# Patient Record
Sex: MALE | Race: WHITE | NOT HISPANIC OR LATINO | URBAN - METROPOLITAN AREA
[De-identification: names, ages, dates, MRNs, and addresses within clinical notes are randomized per-mention and may not be internally consistent; named-entity substitution may affect disease eponyms.]

---

## 2019-09-28 ENCOUNTER — EMERGENCY (EMERGENCY)
Facility: HOSPITAL | Age: 79
LOS: 0 days | Discharge: HOME | End: 2019-09-28
Attending: EMERGENCY MEDICINE
Payer: MEDICARE

## 2019-09-28 VITALS
TEMPERATURE: 98 F | RESPIRATION RATE: 18 BRPM | HEART RATE: 60 BPM | SYSTOLIC BLOOD PRESSURE: 154 MMHG | OXYGEN SATURATION: 98 % | DIASTOLIC BLOOD PRESSURE: 85 MMHG

## 2019-09-28 VITALS — HEIGHT: 67 IN | WEIGHT: 164.91 LBS

## 2019-09-28 DIAGNOSIS — Z91.013 ALLERGY TO SEAFOOD: ICD-10-CM

## 2019-09-28 DIAGNOSIS — N40.0 BENIGN PROSTATIC HYPERPLASIA WITHOUT LOWER URINARY TRACT SYMPTOMS: ICD-10-CM
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
## 2019-09-28 DIAGNOSIS — E11.9 TYPE 2 DIABETES MELLITUS WITHOUT COMPLICATIONS: ICD-10-CM

## 2019-09-28 DIAGNOSIS — I10 ESSENTIAL (PRIMARY) HYPERTENSION: ICD-10-CM
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
## 2019-09-28 DIAGNOSIS — E78.5 HYPERLIPIDEMIA, UNSPECIFIED: ICD-10-CM

## 2019-09-28 DIAGNOSIS — M70.21 OLECRANON BURSITIS, RIGHT ELBOW: ICD-10-CM

## 2019-09-28 DIAGNOSIS — M25.521 PAIN IN RIGHT ELBOW: ICD-10-CM

## 2019-09-28 PROCEDURE — 99283 EMERGENCY DEPT VISIT LOW MDM: CPT | Mod: 25

## 2019-09-28 PROCEDURE — 73080 X-RAY EXAM OF ELBOW: CPT | Mod: 26,RT

## 2019-09-28 PROCEDURE — 73080 X-RAY EXAM OF ELBOW: CPT | Mod: RT

## 2019-09-28 PROCEDURE — 99285 EMERGENCY DEPT VISIT HI MDM: CPT

## 2019-09-28 NOTE — ED STATDOCS - PATIENT PORTAL LINK FT
You can access the FollowMyHealth Patient Portal offered by [facility] by registering at the following website: [email or web address]. By joining [facility]’s FollowMyHealth portal, you will also be able to view your health information using other applications (apps) compatible with our system.

## 2019-09-28 NOTE — ED STATDOCS - OBJECTIVE STATEMENT
78 y/o male with a PMHx of HTN, DM, presents to the ED c/o right elbow pain. Pt reports he was pulling something when he hit his elbow onto a wall a few days ago. Noticed swelling this morning and came to the ED. Denies fevers, chills, N/V/D. Denies any other acute complaints at this time. On Metformin and Metroprolol. PMD: Dr. Jones.

## 2019-09-28 NOTE — ED STATDOCS - MUSCULOSKELETAL, MLM
range of motion is not limited and there is no muscle tenderness. range of motion is not limited. +Swelling and mild TTP to dorsal elbow without redness or warmth consistent with bursitis

## 2019-09-28 NOTE — ED STATDOCS - ATTENDING CONTRIBUTION TO CARE
I, Ira Madden MD,  performed the initial face to face bedside interview with this patient regarding history of present illness, review of symptoms and relevant past medical, social and family history.  I completed an independent physical examination.  I was the initial provider who evaluated this patient. I have signed out the follow up of any pending tests (i.e. labs, radiological studies) to the ACP.  I have communicated the patient’s plan of care and disposition with the ACP.  The history, relevant review of systems, past medical and surgical history, medical decision making, and physical examination was documented by the scribe in my presence and I attest to the accuracy of the documentation.

## 2019-09-28 NOTE — ED STATDOCS - PROGRESS NOTE DETAILS
78 yo male with a PMH of htn, hld, bph presents with R elbow swelling. Pt states he was leaning on a high piece of wood and was pulling to take something out. Denies pain, numbness. Pt with swelling to the olecranon, nontender to palpation. FROM of the b/l UE. Likely bursitis. Will receive XR and if unremarkable, place in sling with ortho referral. -Bret Aguilar PA-C

## 2020-11-10 NOTE — ED ADULT TRIAGE NOTE - AS HEIGHT TYPE
757: xr notified of pt's d/c pending test.     Discharge instructions reviewed with pt to his satisfaction. Signed copy on pt's ppr chart and original given to pt. New rxs given to pt. Iv/tele removed. Pt d/c'd to home. stated

## 2021-01-26 PROBLEM — I10 ESSENTIAL (PRIMARY) HYPERTENSION: Chronic | Status: ACTIVE | Noted: 2019-09-29

## 2021-03-30 ENCOUNTER — APPOINTMENT (OUTPATIENT)
Dept: UROLOGY | Facility: CLINIC | Age: 81
End: 2021-03-30
Payer: MEDICARE

## 2021-03-30 ENCOUNTER — TRANSCRIPTION ENCOUNTER (OUTPATIENT)
Age: 81
End: 2021-03-30

## 2021-03-30 VITALS
HEIGHT: 67 IN | BODY MASS INDEX: 25.11 KG/M2 | HEART RATE: 59 BPM | TEMPERATURE: 97.2 F | WEIGHT: 160 LBS | SYSTOLIC BLOOD PRESSURE: 146 MMHG | OXYGEN SATURATION: 97 % | DIASTOLIC BLOOD PRESSURE: 87 MMHG

## 2021-03-30 DIAGNOSIS — N40.0 BENIGN PROSTATIC HYPERPLASIA WITHOUT LOWER URINARY TRACT SYMPMS: ICD-10-CM

## 2021-03-30 DIAGNOSIS — N13.8 BENIGN PROSTATIC HYPERPLASIA WITH LOWER URINARY TRACT SYMPMS: ICD-10-CM

## 2021-03-30 DIAGNOSIS — N40.1 BENIGN PROSTATIC HYPERPLASIA WITH LOWER URINARY TRACT SYMPMS: ICD-10-CM

## 2021-03-30 DIAGNOSIS — Z78.9 OTHER SPECIFIED HEALTH STATUS: ICD-10-CM

## 2021-03-30 PROCEDURE — 99204 OFFICE O/P NEW MOD 45 MIN: CPT

## 2021-03-30 RX ORDER — FLUTICASONE PROPIONATE 50 MCG
SPRAY, SUSPENSION NASAL
Refills: 0 | Status: ACTIVE | COMMUNITY

## 2021-03-30 RX ORDER — TIMOLOL MALEATE 5 MG/ML
0.5 SOLUTION OPHTHALMIC
Refills: 0 | Status: ACTIVE | COMMUNITY

## 2021-03-30 RX ORDER — METFORMIN HYDROCHLORIDE 625 MG/1
TABLET ORAL
Refills: 0 | Status: ACTIVE | COMMUNITY

## 2021-03-30 RX ORDER — VITS A,C,E/LUTEIN/MINERALS 300MCG-200
TABLET ORAL
Refills: 0 | Status: ACTIVE | COMMUNITY

## 2021-03-30 NOTE — PHYSICAL EXAM
[General Appearance - Well Nourished] : well nourished [General Appearance - Well Developed] : well developed [Normal Appearance] : normal appearance [Well Groomed] : well groomed [General Appearance - In No Acute Distress] : no acute distress [Edema] : no peripheral edema [Respiration, Rhythm And Depth] : normal respiratory rhythm and effort [Exaggerated Use Of Accessory Muscles For Inspiration] : no accessory muscle use [Abdomen Soft] : soft [Abdomen Tenderness] : non-tender [Costovertebral Angle Tenderness] : no ~M costovertebral angle tenderness [Urinary Bladder Findings] : the bladder was normal on palpation [Normal Station and Gait] : the gait and station were normal for the patient's age [] : no rash [No Focal Deficits] : no focal deficits [Oriented To Time, Place, And Person] : oriented to person, place, and time [Affect] : the affect was normal [Mood] : the mood was normal [Not Anxious] : not anxious [No Palpable Adenopathy] : no palpable adenopathy

## 2021-03-30 NOTE — REVIEW OF SYSTEMS
[Eyesight Problems] : eyesight problems [Strong urge to urinate] : strong urge to urinate [Bladder pressure] : experiences bladder pressure [Slow urine stream] : slow urine stream [see HPI] : see HPI [Joint Pain] : joint pain [Negative] : Heme/Lymph [FreeTextEntry2] : frequent urination

## 2021-03-30 NOTE — HISTORY OF PRESENT ILLNESS
[FreeTextEntry1] : 81 year old man seen 03/30/2021 with complaint of nocturia x 2-3.  This began many years ago. Patient states he is here because he was found to have an enlarged prostate on his colonoscopy. Patient reports he has history of BPH and is s/p green light TUVP in 4/2019 by Dr. Toribio and reports his LUTS have improved but notes he has some incontinence after the procedure and continues to have nocturia 2-3x. He is currently on Proscar and would like to know if he should continue Fomax. His PSA in 2018 was noted to be 1. \par It is mild in severity. Nothing makes the symptoms better, nothing makes sx worse. \par It is associated with nothing.\par No hematuria, no dysuria, no frequency during day time, no straining. No incontinence. \par No fevers, no chills, no nausea, no vomiting, no flank pain. \par \par No family history contributory to BPH\par \par 3/30/21 PVR 1 mL

## 2021-03-30 NOTE — ASSESSMENT
[FreeTextEntry1] : 80 yo male with PMH of BPH s/p Green light TUVP in 2019, continues to have some nocturia and weak stream. Discussed with patient he can continue Finasteride and restart Tamsulosin 0.4 mg QHS. Also discussed with patient timed voiding may help with incontinence. He understands and agrees.\par - UA/UCx\par - RTO in 3 months for symptom check or sooner PRN

## 2021-03-31 LAB
APPEARANCE: CLEAR
BACTERIA: NEGATIVE
BILIRUBIN URINE: NEGATIVE
BLOOD URINE: NEGATIVE
COLOR: NORMAL
GLUCOSE QUALITATIVE U: NEGATIVE
HYALINE CASTS: 0 /LPF
KETONES URINE: NEGATIVE
LEUKOCYTE ESTERASE URINE: NEGATIVE
MICROSCOPIC-UA: NORMAL
NITRITE URINE: NEGATIVE
PH URINE: 6
PROTEIN URINE: NEGATIVE
RED BLOOD CELLS URINE: 1 /HPF
SPECIFIC GRAVITY URINE: 1.01
SQUAMOUS EPITHELIAL CELLS: 0 /HPF
UROBILINOGEN URINE: NORMAL
WHITE BLOOD CELLS URINE: 0 /HPF

## 2021-04-01 LAB — BACTERIA UR CULT: NORMAL

## 2021-06-29 ENCOUNTER — APPOINTMENT (OUTPATIENT)
Dept: UROLOGY | Facility: CLINIC | Age: 81
End: 2021-06-29

## 2022-04-20 ENCOUNTER — OUTPATIENT (OUTPATIENT)
Dept: OUTPATIENT SERVICES | Facility: HOSPITAL | Age: 82
LOS: 1 days | End: 2022-04-20
Payer: MEDICARE

## 2022-04-20 VITALS
HEART RATE: 58 BPM | HEIGHT: 65 IN | SYSTOLIC BLOOD PRESSURE: 142 MMHG | DIASTOLIC BLOOD PRESSURE: 80 MMHG | TEMPERATURE: 98 F | WEIGHT: 162.92 LBS | RESPIRATION RATE: 16 BRPM | OXYGEN SATURATION: 99 %

## 2022-04-20 DIAGNOSIS — Z98.890 OTHER SPECIFIED POSTPROCEDURAL STATES: Chronic | ICD-10-CM

## 2022-04-20 DIAGNOSIS — Z01.818 ENCOUNTER FOR OTHER PREPROCEDURAL EXAMINATION: ICD-10-CM

## 2022-04-20 DIAGNOSIS — Z29.9 ENCOUNTER FOR PROPHYLACTIC MEASURES, UNSPECIFIED: ICD-10-CM

## 2022-04-20 DIAGNOSIS — M17.10 UNILATERAL PRIMARY OSTEOARTHRITIS, UNSPECIFIED KNEE: Chronic | ICD-10-CM

## 2022-04-20 DIAGNOSIS — M17.12 UNILATERAL PRIMARY OSTEOARTHRITIS, LEFT KNEE: ICD-10-CM

## 2022-04-20 LAB
ALBUMIN SERPL ELPH-MCNC: 4.2 G/DL — SIGNIFICANT CHANGE UP (ref 3.3–5)
ALP SERPL-CCNC: 57 U/L — SIGNIFICANT CHANGE UP (ref 40–120)
ALT FLD-CCNC: 23 U/L — SIGNIFICANT CHANGE UP (ref 12–78)
ANION GAP SERPL CALC-SCNC: 7 MMOL/L — SIGNIFICANT CHANGE UP (ref 5–17)
AST SERPL-CCNC: 21 U/L — SIGNIFICANT CHANGE UP (ref 15–37)
BASOPHILS # BLD AUTO: 0.04 K/UL — SIGNIFICANT CHANGE UP (ref 0–0.2)
BASOPHILS NFR BLD AUTO: 0.5 % — SIGNIFICANT CHANGE UP (ref 0–2)
BILIRUB SERPL-MCNC: 0.5 MG/DL — SIGNIFICANT CHANGE UP (ref 0.2–1.2)
BUN SERPL-MCNC: 33 MG/DL — HIGH (ref 7–23)
CALCIUM SERPL-MCNC: 9.7 MG/DL — SIGNIFICANT CHANGE UP (ref 8.5–10.1)
CHLORIDE SERPL-SCNC: 102 MMOL/L — SIGNIFICANT CHANGE UP (ref 96–108)
CO2 SERPL-SCNC: 28 MMOL/L — SIGNIFICANT CHANGE UP (ref 22–31)
CREAT SERPL-MCNC: 1.19 MG/DL — SIGNIFICANT CHANGE UP (ref 0.5–1.3)
EGFR: 61 ML/MIN/1.73M2 — SIGNIFICANT CHANGE UP
EOSINOPHIL # BLD AUTO: 0.13 K/UL — SIGNIFICANT CHANGE UP (ref 0–0.5)
EOSINOPHIL NFR BLD AUTO: 1.6 % — SIGNIFICANT CHANGE UP (ref 0–6)
GLUCOSE SERPL-MCNC: 123 MG/DL — HIGH (ref 70–99)
HCT VFR BLD CALC: 44.1 % — SIGNIFICANT CHANGE UP (ref 39–50)
HGB BLD-MCNC: 14.6 G/DL — SIGNIFICANT CHANGE UP (ref 13–17)
IMM GRANULOCYTES NFR BLD AUTO: 0.5 % — SIGNIFICANT CHANGE UP (ref 0–1.5)
INR BLD: 1.12 RATIO — SIGNIFICANT CHANGE UP (ref 0.88–1.16)
LYMPHOCYTES # BLD AUTO: 2 K/UL — SIGNIFICANT CHANGE UP (ref 1–3.3)
LYMPHOCYTES # BLD AUTO: 24.6 % — SIGNIFICANT CHANGE UP (ref 13–44)
MCHC RBC-ENTMCNC: 30.9 PG — SIGNIFICANT CHANGE UP (ref 27–34)
MCHC RBC-ENTMCNC: 33.1 GM/DL — SIGNIFICANT CHANGE UP (ref 32–36)
MCV RBC AUTO: 93.4 FL — SIGNIFICANT CHANGE UP (ref 80–100)
MONOCYTES # BLD AUTO: 1 K/UL — HIGH (ref 0–0.9)
MONOCYTES NFR BLD AUTO: 12.3 % — SIGNIFICANT CHANGE UP (ref 2–14)
NEUTROPHILS # BLD AUTO: 4.91 K/UL — SIGNIFICANT CHANGE UP (ref 1.8–7.4)
NEUTROPHILS NFR BLD AUTO: 60.5 % — SIGNIFICANT CHANGE UP (ref 43–77)
PLATELET # BLD AUTO: 220 K/UL — SIGNIFICANT CHANGE UP (ref 150–400)
POTASSIUM SERPL-MCNC: 4.3 MMOL/L — SIGNIFICANT CHANGE UP (ref 3.5–5.3)
POTASSIUM SERPL-SCNC: 4.3 MMOL/L — SIGNIFICANT CHANGE UP (ref 3.5–5.3)
PROT SERPL-MCNC: 7.9 GM/DL — SIGNIFICANT CHANGE UP (ref 6–8.3)
PROTHROM AB SERPL-ACNC: 13 SEC — SIGNIFICANT CHANGE UP (ref 10.5–13.4)
RBC # BLD: 4.72 M/UL — SIGNIFICANT CHANGE UP (ref 4.2–5.8)
RBC # FLD: 13.1 % — SIGNIFICANT CHANGE UP (ref 10.3–14.5)
SODIUM SERPL-SCNC: 137 MMOL/L — SIGNIFICANT CHANGE UP (ref 135–145)
WBC # BLD: 8.12 K/UL — SIGNIFICANT CHANGE UP (ref 3.8–10.5)
WBC # FLD AUTO: 8.12 K/UL — SIGNIFICANT CHANGE UP (ref 3.8–10.5)

## 2022-04-20 PROCEDURE — 85610 PROTHROMBIN TIME: CPT

## 2022-04-20 PROCEDURE — 86900 BLOOD TYPING SEROLOGIC ABO: CPT

## 2022-04-20 PROCEDURE — 93010 ELECTROCARDIOGRAM REPORT: CPT

## 2022-04-20 PROCEDURE — 83036 HEMOGLOBIN GLYCOSYLATED A1C: CPT

## 2022-04-20 PROCEDURE — 93005 ELECTROCARDIOGRAM TRACING: CPT

## 2022-04-20 PROCEDURE — 87640 STAPH A DNA AMP PROBE: CPT

## 2022-04-20 PROCEDURE — 36415 COLL VENOUS BLD VENIPUNCTURE: CPT

## 2022-04-20 PROCEDURE — 80053 COMPREHEN METABOLIC PANEL: CPT

## 2022-04-20 PROCEDURE — G0463: CPT | Mod: 25

## 2022-04-20 PROCEDURE — 85025 COMPLETE CBC W/AUTO DIFF WBC: CPT

## 2022-04-20 PROCEDURE — 86901 BLOOD TYPING SEROLOGIC RH(D): CPT

## 2022-04-20 PROCEDURE — 71046 X-RAY EXAM CHEST 2 VIEWS: CPT | Mod: 26

## 2022-04-20 PROCEDURE — 71046 X-RAY EXAM CHEST 2 VIEWS: CPT

## 2022-04-20 PROCEDURE — 87641 MR-STAPH DNA AMP PROBE: CPT

## 2022-04-20 PROCEDURE — 86850 RBC ANTIBODY SCREEN: CPT

## 2022-04-20 NOTE — H&P PST ADULT - NSICDXFAMILYHX_GEN_ALL_CORE_FT
FAMILY HISTORY:  Mother  Still living? No  Family hx of hypertension, Age at diagnosis: Age Unknown

## 2022-04-20 NOTE — H&P PST ADULT - ASSESSMENT
82 years old male present to PST prior to left knee replacement with Dr. Jones.     Plan   1. NPO as per ASU  2. Take the following medications with sips of water on the day of procedure: Losartan  3. Use E-Z sponge as directed  4. Use Mupirocin as directed.  5. Drink a quart of extra  fluids the day before your surgery.  6 Medical optimization for surgery with Dr. Jones and cardiac evaluation with   7. CBC, BMP, HGB AIC, Albumin, PT/ INR and PTT,Type and Screen, MRSA sent to lab  8. EKG and Chest x- ray done  9. Covid swab scheduled for 2022    CAPRINI SCORE [CLOT]    AGE RELATED RISK FACTORS                                                       MOBILITY RELATED FACTORS  [ ] Age 41-60 years                                            (1 Point)                  [ ] Bed rest                                                        (1 Point)  [ ] Age: 61-74 years                                           (2 Points)                 [ ] Plaster cast                                                   (2 Points)  [x ] Age> 75 years                                              (3 Points)                 [ ] Bed bound for more than 72 hours                 (2 Points)    DISEASE RELATED RISK FACTORS                                               GENDER SPECIFIC FACTORS  [ ] Edema in the lower extremities                       (1 Point)                  [ ] Pregnancy                                                     (1 Point)  [ ] Varicose veins                                               (1 Point)                  [ ] Post-partum < 6 weeks                                   (1 Point)             [x ] BMI > 25 Kg/m2                                            (1 Point)                  [ ] Hormonal therapy  or oral contraception          (1 Point)                 [ ] Sepsis (in the previous month)                        (1 Point)                  [ ] History of pregnancy complications                 (1 point)  [ ] Pneumonia or serious lung disease                                               [ ] Unexplained or recurrent                     (1 Point)           (in the previous month)                               (1 Point)  [ ] Abnormal pulmonary function test                     (1 Point)                 SURGERY RELATED RISK FACTORS  [ ] Acute myocardial infarction                              (1 Point)                 [ ]  Section                                             (1 Point)  [ ] Congestive heart failure (in the previous month)  (1 Point)               [ ] Minor surgery                                                  (1 Point)   [ ] Inflammatory bowel disease                             (1 Point)                 [ ] Arthroscopic surgery                                        (2 Points)  [ ] Central venous access                                      (2 Points)                [ ] General surgery lasting more than 45 minutes   (2 Points)       [ ] Stroke (in the previous month)                          (5 Points)               [x ] Elective arthroplasty                                         (5 Points)            [ ] malignancy present or previous                      (2 points)                                                                                                                                   HEMATOLOGY RELATED FACTORS                                                 TRAUMA RELATED RISK FACTORS  [ ] Prior episodes of VTE                                     (3 Points)                 [ ] Fracture of the hip, pelvis, or leg                       (5 Points)  [ ] Positive family history for VTE                         (3 Points)                 [ ] Acute spinal cord injury (in the previous month)  (5 Points)  [ ] Prothrombin 68831 A                                     (3 Points)                 [ ] Paralysis  (less than 1 month)                             (5 Points)  [ ] Factor V Leiden                                             (3 Points)                  [ ] Multiple Trauma within 1 month                        (5 Points)  [ ] Lupus anticoagulants                                     (3 Points)                                                           [ ] Anticardiolipin antibodies                               (3 Points)                                                       [ ] High homocysteine in the blood                      (3 Points)                                             [ ] Other congenital or acquired thrombophilia      (3 Points)                                                [ ] Heparin induced thrombocytopenia                  (3 Points)                                          Total Score [     9     ]

## 2022-04-20 NOTE — H&P PST ADULT - NSICDXPASTSURGICALHX_GEN_ALL_CORE_FT
PAST SURGICAL HISTORY:  H/O laser photocoagulation of retina     History of colonoscopy last done 2021    History of nasal septoplasty due to deviated septum    History of prostate surgery Grennlight TUVP 4/ 2019    S/P hernia repair

## 2022-04-20 NOTE — H&P PST ADULT - NSICDXPASTMEDICALHX_GEN_ALL_CORE_FT
PAST MEDICAL HISTORY:  Anxiety     BPH (benign prostatic hyperplasia)     DNS (deviated nasal septum) history of. Corrected with surgery    HLD (hyperlipidemia)     [place] (hard of hearing) Bilateral hearing aids    HTN (hypertension)     Macular degeneration     Nocturia     Osteoarthritis left knee    Type II diabetes mellitus     Urinary incontinence

## 2022-04-20 NOTE — H&P PST ADULT - HISTORY OF PRESENT ILLNESS
82 years old male with osteoarthritis of the left knee. Patient with progressively increase in pain to left knee. Intra articular cortisone injections in the past. Pain persist. Denies swelling. Reports buckling of knee. Planned left knee replacement.

## 2022-04-20 NOTE — H&P PST ADULT - TOBACCO USE
Last medication check 12/15/21  Last physical 12/15/21  Last refill 1/4/22  Medication check due 6/2022    Please advise refill request ritalin la    PDMP verified   Prescribed: 1/4/2022  Dispensed: 1/4/2022    
Never smoker

## 2022-04-21 DIAGNOSIS — Z01.818 ENCOUNTER FOR OTHER PREPROCEDURAL EXAMINATION: ICD-10-CM

## 2022-04-21 DIAGNOSIS — M17.12 UNILATERAL PRIMARY OSTEOARTHRITIS, LEFT KNEE: ICD-10-CM

## 2022-04-21 LAB
A1C WITH ESTIMATED AVERAGE GLUCOSE RESULT: 6.7 % — HIGH (ref 4–5.6)
ESTIMATED AVERAGE GLUCOSE: 146 MG/DL — HIGH (ref 68–114)
MRSA PCR RESULT.: SIGNIFICANT CHANGE UP
S AUREUS DNA NOSE QL NAA+PROBE: SIGNIFICANT CHANGE UP

## 2022-04-28 RX ORDER — OXYCODONE HYDROCHLORIDE 5 MG/1
10 TABLET ORAL ONCE
Refills: 0 | Status: DISCONTINUED | OUTPATIENT
Start: 2022-04-29 | End: 2022-04-29

## 2022-04-28 RX ORDER — FENTANYL CITRATE 50 UG/ML
50 INJECTION INTRAVENOUS
Refills: 0 | Status: DISCONTINUED | OUTPATIENT
Start: 2022-04-29 | End: 2022-04-29

## 2022-04-28 RX ORDER — SODIUM CHLORIDE 9 MG/ML
1000 INJECTION, SOLUTION INTRAVENOUS
Refills: 0 | Status: DISCONTINUED | OUTPATIENT
Start: 2022-04-29 | End: 2022-04-29

## 2022-04-28 RX ORDER — ONDANSETRON 8 MG/1
4 TABLET, FILM COATED ORAL ONCE
Refills: 0 | Status: DISCONTINUED | OUTPATIENT
Start: 2022-04-29 | End: 2022-04-29

## 2022-04-29 ENCOUNTER — OUTPATIENT (OUTPATIENT)
Dept: INPATIENT UNIT | Facility: HOSPITAL | Age: 82
LOS: 1 days | Discharge: HOME | End: 2022-04-29
Payer: MEDICARE

## 2022-04-29 ENCOUNTER — TRANSCRIPTION ENCOUNTER (OUTPATIENT)
Age: 82
End: 2022-04-29

## 2022-04-29 ENCOUNTER — RESULT REVIEW (OUTPATIENT)
Age: 82
End: 2022-04-29

## 2022-04-29 VITALS
TEMPERATURE: 97 F | WEIGHT: 162.92 LBS | OXYGEN SATURATION: 100 % | SYSTOLIC BLOOD PRESSURE: 185 MMHG | HEART RATE: 58 BPM | DIASTOLIC BLOOD PRESSURE: 97 MMHG | HEIGHT: 65 IN | RESPIRATION RATE: 15 BRPM

## 2022-04-29 DIAGNOSIS — Z98.890 OTHER SPECIFIED POSTPROCEDURAL STATES: Chronic | ICD-10-CM

## 2022-04-29 DIAGNOSIS — M17.12 UNILATERAL PRIMARY OSTEOARTHRITIS, LEFT KNEE: ICD-10-CM

## 2022-04-29 LAB
ANION GAP SERPL CALC-SCNC: 5 MMOL/L — SIGNIFICANT CHANGE UP (ref 5–17)
BUN SERPL-MCNC: 28 MG/DL — HIGH (ref 7–23)
CALCIUM SERPL-MCNC: 8.9 MG/DL — SIGNIFICANT CHANGE UP (ref 8.5–10.1)
CHLORIDE SERPL-SCNC: 106 MMOL/L — SIGNIFICANT CHANGE UP (ref 96–108)
CO2 SERPL-SCNC: 25 MMOL/L — SIGNIFICANT CHANGE UP (ref 22–31)
CREAT SERPL-MCNC: 1.14 MG/DL — SIGNIFICANT CHANGE UP (ref 0.5–1.3)
EGFR: 64 ML/MIN/1.73M2 — SIGNIFICANT CHANGE UP
GLUCOSE SERPL-MCNC: 128 MG/DL — HIGH (ref 70–99)
HCT VFR BLD CALC: 39.3 % — SIGNIFICANT CHANGE UP (ref 39–50)
HGB BLD-MCNC: 12.9 G/DL — LOW (ref 13–17)
MCHC RBC-ENTMCNC: 30.5 PG — SIGNIFICANT CHANGE UP (ref 27–34)
MCHC RBC-ENTMCNC: 32.8 GM/DL — SIGNIFICANT CHANGE UP (ref 32–36)
MCV RBC AUTO: 92.9 FL — SIGNIFICANT CHANGE UP (ref 80–100)
PLATELET # BLD AUTO: 177 K/UL — SIGNIFICANT CHANGE UP (ref 150–400)
POTASSIUM SERPL-MCNC: 4.4 MMOL/L — SIGNIFICANT CHANGE UP (ref 3.5–5.3)
POTASSIUM SERPL-SCNC: 4.4 MMOL/L — SIGNIFICANT CHANGE UP (ref 3.5–5.3)
RBC # BLD: 4.23 M/UL — SIGNIFICANT CHANGE UP (ref 4.2–5.8)
RBC # FLD: 13 % — SIGNIFICANT CHANGE UP (ref 10.3–14.5)
SODIUM SERPL-SCNC: 136 MMOL/L — SIGNIFICANT CHANGE UP (ref 135–145)
WBC # BLD: 8.56 K/UL — SIGNIFICANT CHANGE UP (ref 3.8–10.5)
WBC # FLD AUTO: 8.56 K/UL — SIGNIFICANT CHANGE UP (ref 3.8–10.5)

## 2022-04-29 PROCEDURE — C1776: CPT

## 2022-04-29 PROCEDURE — 36415 COLL VENOUS BLD VENIPUNCTURE: CPT

## 2022-04-29 PROCEDURE — 88305 TISSUE EXAM BY PATHOLOGIST: CPT | Mod: 26

## 2022-04-29 PROCEDURE — 85027 COMPLETE CBC AUTOMATED: CPT

## 2022-04-29 PROCEDURE — 73560 X-RAY EXAM OF KNEE 1 OR 2: CPT | Mod: LT

## 2022-04-29 PROCEDURE — 99223 1ST HOSP IP/OBS HIGH 75: CPT

## 2022-04-29 PROCEDURE — C1713: CPT

## 2022-04-29 PROCEDURE — 99221 1ST HOSP IP/OBS SF/LOW 40: CPT

## 2022-04-29 PROCEDURE — 82962 GLUCOSE BLOOD TEST: CPT

## 2022-04-29 PROCEDURE — 27447 TOTAL KNEE ARTHROPLASTY: CPT | Mod: AS

## 2022-04-29 PROCEDURE — 73560 X-RAY EXAM OF KNEE 1 OR 2: CPT | Mod: 26,LT

## 2022-04-29 PROCEDURE — 80048 BASIC METABOLIC PNL TOTAL CA: CPT | Mod: 91

## 2022-04-29 PROCEDURE — 88305 TISSUE EXAM BY PATHOLOGIST: CPT

## 2022-04-29 RX ORDER — SIMETHICONE 80 MG/1
80 TABLET, CHEWABLE ORAL ONCE
Refills: 0 | Status: COMPLETED | OUTPATIENT
Start: 2022-04-29 | End: 2022-04-29

## 2022-04-29 RX ORDER — OXYCODONE HYDROCHLORIDE 5 MG/1
1 TABLET ORAL
Qty: 30 | Refills: 0
Start: 2022-04-29 | End: 2022-05-03

## 2022-04-29 RX ORDER — DEXTROSE 50 % IN WATER 50 %
15 SYRINGE (ML) INTRAVENOUS ONCE
Refills: 0 | Status: DISCONTINUED | OUTPATIENT
Start: 2022-04-29 | End: 2022-04-30

## 2022-04-29 RX ORDER — PANTOPRAZOLE SODIUM 20 MG/1
40 TABLET, DELAYED RELEASE ORAL ONCE
Refills: 0 | Status: COMPLETED | OUTPATIENT
Start: 2022-04-29 | End: 2022-04-29

## 2022-04-29 RX ORDER — CEFAZOLIN SODIUM 1 G
2000 VIAL (EA) INJECTION EVERY 8 HOURS
Refills: 0 | Status: COMPLETED | OUTPATIENT
Start: 2022-04-29 | End: 2022-04-30

## 2022-04-29 RX ORDER — AMLODIPINE BESYLATE 2.5 MG/1
5 TABLET ORAL DAILY
Refills: 0 | Status: DISCONTINUED | OUTPATIENT
Start: 2022-04-29 | End: 2022-04-29

## 2022-04-29 RX ORDER — DEXAMETHASONE 0.5 MG/5ML
8 ELIXIR ORAL ONCE
Refills: 0 | Status: COMPLETED | OUTPATIENT
Start: 2022-04-30 | End: 2022-04-30

## 2022-04-29 RX ORDER — MULTIVIT-MIN/FERROUS GLUCONATE 9 MG/15 ML
1 LIQUID (ML) ORAL
Qty: 0 | Refills: 0 | DISCHARGE

## 2022-04-29 RX ORDER — PROCHLORPERAZINE MALEATE 5 MG
5 TABLET ORAL ONCE
Refills: 0 | Status: COMPLETED | OUTPATIENT
Start: 2022-04-29 | End: 2022-04-30

## 2022-04-29 RX ORDER — LOSARTAN POTASSIUM 100 MG/1
50 TABLET, FILM COATED ORAL DAILY
Refills: 0 | Status: DISCONTINUED | OUTPATIENT
Start: 2022-04-29 | End: 2022-04-29

## 2022-04-29 RX ORDER — AMLODIPINE BESYLATE 2.5 MG/1
1 TABLET ORAL
Qty: 0 | Refills: 0 | DISCHARGE

## 2022-04-29 RX ORDER — SODIUM CHLORIDE 9 MG/ML
1000 INJECTION, SOLUTION INTRAVENOUS
Refills: 0 | Status: DISCONTINUED | OUTPATIENT
Start: 2022-04-30 | End: 2022-04-30

## 2022-04-29 RX ORDER — ACETAMINOPHEN 500 MG
1000 TABLET ORAL ONCE
Refills: 0 | Status: COMPLETED | OUTPATIENT
Start: 2022-04-29 | End: 2022-04-29

## 2022-04-29 RX ORDER — ATORVASTATIN CALCIUM 80 MG/1
40 TABLET, FILM COATED ORAL AT BEDTIME
Refills: 0 | Status: DISCONTINUED | OUTPATIENT
Start: 2022-04-29 | End: 2022-04-29

## 2022-04-29 RX ORDER — SODIUM CHLORIDE 9 MG/ML
1000 INJECTION, SOLUTION INTRAVENOUS
Refills: 0 | Status: DISCONTINUED | OUTPATIENT
Start: 2022-04-29 | End: 2022-04-30

## 2022-04-29 RX ORDER — HYDROMORPHONE HYDROCHLORIDE 2 MG/ML
0.5 INJECTION INTRAMUSCULAR; INTRAVENOUS; SUBCUTANEOUS EVERY 4 HOURS
Refills: 0 | Status: DISCONTINUED | OUTPATIENT
Start: 2022-04-29 | End: 2022-04-30

## 2022-04-29 RX ORDER — CELECOXIB 200 MG/1
200 CAPSULE ORAL EVERY 12 HOURS
Refills: 0 | Status: DISCONTINUED | OUTPATIENT
Start: 2022-04-30 | End: 2022-04-30

## 2022-04-29 RX ORDER — RIVAROXABAN 15 MG-20MG
10 KIT ORAL DAILY
Refills: 0 | Status: DISCONTINUED | OUTPATIENT
Start: 2022-04-30 | End: 2022-04-30

## 2022-04-29 RX ORDER — OXYCODONE HYDROCHLORIDE 5 MG/1
10 TABLET ORAL ONCE
Refills: 0 | Status: DISCONTINUED | OUTPATIENT
Start: 2022-04-29 | End: 2022-04-30

## 2022-04-29 RX ORDER — OXYCODONE HYDROCHLORIDE 5 MG/1
5 TABLET ORAL
Refills: 0 | Status: DISCONTINUED | OUTPATIENT
Start: 2022-04-29 | End: 2022-04-30

## 2022-04-29 RX ORDER — INSULIN LISPRO 100/ML
VIAL (ML) SUBCUTANEOUS
Refills: 0 | Status: DISCONTINUED | OUTPATIENT
Start: 2022-04-29 | End: 2022-04-30

## 2022-04-29 RX ORDER — METFORMIN HYDROCHLORIDE 850 MG/1
500 TABLET ORAL
Refills: 0 | Status: DISCONTINUED | OUTPATIENT
Start: 2022-04-29 | End: 2022-04-29

## 2022-04-29 RX ORDER — DEXTROSE 50 % IN WATER 50 %
25 SYRINGE (ML) INTRAVENOUS ONCE
Refills: 0 | Status: DISCONTINUED | OUTPATIENT
Start: 2022-04-29 | End: 2022-04-30

## 2022-04-29 RX ORDER — ESCITALOPRAM OXALATE 10 MG/1
10 TABLET, FILM COATED ORAL DAILY
Refills: 0 | Status: DISCONTINUED | OUTPATIENT
Start: 2022-04-29 | End: 2022-04-29

## 2022-04-29 RX ORDER — AMLODIPINE BESYLATE 2.5 MG/1
2.5 TABLET ORAL DAILY
Refills: 0 | Status: DISCONTINUED | OUTPATIENT
Start: 2022-04-29 | End: 2022-04-30

## 2022-04-29 RX ORDER — SENNA PLUS 8.6 MG/1
2 TABLET ORAL AT BEDTIME
Refills: 0 | Status: DISCONTINUED | OUTPATIENT
Start: 2022-04-29 | End: 2022-04-30

## 2022-04-29 RX ORDER — INSULIN LISPRO 100/ML
VIAL (ML) SUBCUTANEOUS AT BEDTIME
Refills: 0 | Status: DISCONTINUED | OUTPATIENT
Start: 2022-04-29 | End: 2022-04-30

## 2022-04-29 RX ORDER — OXYCODONE HYDROCHLORIDE 5 MG/1
10 TABLET ORAL
Refills: 0 | Status: DISCONTINUED | OUTPATIENT
Start: 2022-04-29 | End: 2022-04-30

## 2022-04-29 RX ORDER — METFORMIN HYDROCHLORIDE 850 MG/1
1 TABLET ORAL
Qty: 0 | Refills: 0 | DISCHARGE

## 2022-04-29 RX ORDER — ESCITALOPRAM OXALATE 10 MG/1
10 TABLET, FILM COATED ORAL DAILY
Refills: 0 | Status: DISCONTINUED | OUTPATIENT
Start: 2022-04-29 | End: 2022-04-30

## 2022-04-29 RX ORDER — HEPARIN SODIUM 5000 [USP'U]/ML
5000 INJECTION INTRAVENOUS; SUBCUTANEOUS ONCE
Refills: 0 | Status: COMPLETED | OUTPATIENT
Start: 2022-04-29 | End: 2022-04-29

## 2022-04-29 RX ORDER — METOPROLOL TARTRATE 50 MG
100 TABLET ORAL DAILY
Refills: 0 | Status: DISCONTINUED | OUTPATIENT
Start: 2022-04-29 | End: 2022-04-29

## 2022-04-29 RX ORDER — LOSARTAN POTASSIUM 100 MG/1
50 TABLET, FILM COATED ORAL DAILY
Refills: 0 | Status: DISCONTINUED | OUTPATIENT
Start: 2022-04-29 | End: 2022-04-30

## 2022-04-29 RX ORDER — PANTOPRAZOLE SODIUM 20 MG/1
1 TABLET, DELAYED RELEASE ORAL
Qty: 30 | Refills: 0
Start: 2022-04-29

## 2022-04-29 RX ORDER — GLUCAGON INJECTION, SOLUTION 0.5 MG/.1ML
1 INJECTION, SOLUTION SUBCUTANEOUS ONCE
Refills: 0 | Status: DISCONTINUED | OUTPATIENT
Start: 2022-04-29 | End: 2022-04-30

## 2022-04-29 RX ORDER — METOPROLOL TARTRATE 50 MG
100 TABLET ORAL DAILY
Refills: 0 | Status: DISCONTINUED | OUTPATIENT
Start: 2022-04-29 | End: 2022-04-30

## 2022-04-29 RX ORDER — FERROUS SULFATE 325(65) MG
325 TABLET ORAL DAILY
Refills: 0 | Status: DISCONTINUED | OUTPATIENT
Start: 2022-04-29 | End: 2022-04-30

## 2022-04-29 RX ORDER — ONDANSETRON 8 MG/1
4 TABLET, FILM COATED ORAL ONCE
Refills: 0 | Status: DISCONTINUED | OUTPATIENT
Start: 2022-04-29 | End: 2022-04-30

## 2022-04-29 RX ORDER — ATORVASTATIN CALCIUM 80 MG/1
40 TABLET, FILM COATED ORAL AT BEDTIME
Refills: 0 | Status: DISCONTINUED | OUTPATIENT
Start: 2022-04-29 | End: 2022-04-30

## 2022-04-29 RX ORDER — FOLIC ACID 0.8 MG
1 TABLET ORAL DAILY
Refills: 0 | Status: DISCONTINUED | OUTPATIENT
Start: 2022-04-29 | End: 2022-04-30

## 2022-04-29 RX ORDER — DEXTROSE 50 % IN WATER 50 %
12.5 SYRINGE (ML) INTRAVENOUS ONCE
Refills: 0 | Status: DISCONTINUED | OUTPATIENT
Start: 2022-04-29 | End: 2022-04-30

## 2022-04-29 RX ORDER — ACETAMINOPHEN 500 MG
975 TABLET ORAL EVERY 8 HOURS
Refills: 0 | Status: DISCONTINUED | OUTPATIENT
Start: 2022-04-29 | End: 2022-04-30

## 2022-04-29 RX ORDER — ONDANSETRON 8 MG/1
4 TABLET, FILM COATED ORAL EVERY 6 HOURS
Refills: 0 | Status: DISCONTINUED | OUTPATIENT
Start: 2022-04-29 | End: 2022-04-30

## 2022-04-29 RX ORDER — PANTOPRAZOLE SODIUM 20 MG/1
40 TABLET, DELAYED RELEASE ORAL
Refills: 0 | Status: DISCONTINUED | OUTPATIENT
Start: 2022-04-29 | End: 2022-04-30

## 2022-04-29 RX ORDER — PANTOPRAZOLE SODIUM 20 MG/1
1 TABLET, DELAYED RELEASE ORAL
Qty: 30 | Refills: 0 | DISCHARGE
Start: 2022-04-29

## 2022-04-29 RX ADMIN — SENNA PLUS 2 TABLET(S): 8.6 TABLET ORAL at 21:30

## 2022-04-29 RX ADMIN — HEPARIN SODIUM 5000 UNIT(S): 5000 INJECTION INTRAVENOUS; SUBCUTANEOUS at 23:45

## 2022-04-29 RX ADMIN — Medication 975 MILLIGRAM(S): at 22:00

## 2022-04-29 RX ADMIN — ATORVASTATIN CALCIUM 40 MILLIGRAM(S): 80 TABLET, FILM COATED ORAL at 21:30

## 2022-04-29 RX ADMIN — Medication 400 MILLIGRAM(S): at 13:32

## 2022-04-29 RX ADMIN — PANTOPRAZOLE SODIUM 40 MILLIGRAM(S): 20 TABLET, DELAYED RELEASE ORAL at 09:38

## 2022-04-29 RX ADMIN — OXYCODONE HYDROCHLORIDE 10 MILLIGRAM(S): 5 TABLET ORAL at 21:30

## 2022-04-29 RX ADMIN — ESCITALOPRAM OXALATE 10 MILLIGRAM(S): 10 TABLET, FILM COATED ORAL at 17:58

## 2022-04-29 RX ADMIN — Medication 975 MILLIGRAM(S): at 21:30

## 2022-04-29 RX ADMIN — AMLODIPINE BESYLATE 5 MILLIGRAM(S): 2.5 TABLET ORAL at 17:59

## 2022-04-29 RX ADMIN — OXYCODONE HYDROCHLORIDE 10 MILLIGRAM(S): 5 TABLET ORAL at 22:00

## 2022-04-29 RX ADMIN — Medication 100 MILLIGRAM(S): at 17:59

## 2022-04-29 RX ADMIN — Medication 100 MILLIGRAM(S): at 18:04

## 2022-04-29 NOTE — CONSULT NOTE ADULT - ASSESSMENT
Patient is a 82y old  Male who presents with a chief complaint of OA left knee, S/P left TKR (29 Apr 2022 12:59). Consulted by Dr.Scott Jones    for VTE prophylaxis, risk stratification, and anticoagulation management. Patient is  high risk for VTE due to age, tourniquet use, surgery, immobility, and BMI, low bleeding risk.  D/W pt use of Xarelto, risks vs benefits and pt is in agreement to use Xarelto    plan    Start Heparin 5000units tonight and will start  Xarelto 10mg tomorrow  Daily CBC, BMP  BLE venodynes  INC mobility as tasia    Thank you for the Consult, will follow

## 2022-04-29 NOTE — CONSULT NOTE ADULT - SUBJECTIVE AND OBJECTIVE BOX
PCP:  Dr Bret Jones    CHIEF COMPLAINT: left knee OA    HISTORY OF THE PRESENT ILLNESS: this is a 81 yo male with PMH of  DM type II, HTN, PAC's, PAF, CV#4 on ASA for anticoagulation and toprol,  depression, Hyperchol, BPH, Glaucoma, macular degeneration, OA  of the with progressive pain with ambulation, walking distances, climbing stairs and INC pain at night. He also h/o instability with his left knee, limited ROM. He failed the usual modalities for knee pain including steroids and Hyluranoic acid  and has now opted for orthopedic intervention.  Pt is seen in PACU,  s/p left TKR, awake alert, had spinal anesthesia, awaiting return of sensation to BLE's  IN NAD, denies any CP or SOB.  States he took his Losartan this am. We are consulted for medical management    PAST MEDICAL HISTORY: as above    PAST SURGICAL HISTORY: colonoscopy, nasal septoplasty, prostate surgery, hernia repai    FAMILY HISTORY:   both parents dec: unknown    SOCIAL HISTORY:  no smoking, no alcohol, no drugs    ALLERGIES: NKDA    HOME MEDS: see med rec    REVIEW OF SYSTEMS:   All 10 systems reviewed in detailed and found to be negative with the exception of what has already been described above    MEDICATIONS  (STANDING):  lactated ringers. 1000 milliLiter(s) (75 mL/Hr) IV Continuous <Continuous>    MEDICATIONS  (PRN):  fentaNYL    Injectable 50 MICROGram(s) IV Push every 5 minutes PRN Severe Pain (7 - 10)  ondansetron Injectable 4 milliGRAM(s) IV Push once PRN Nausea and/or Vomiting  oxyCODONE    IR 10 milliGRAM(s) Oral once PRN Severe Pain (7 - 10)      VITALS:  T(F): 97.4 (04-29-22 @ 12:20), Max: 97.4 (04-29-22 @ 12:20)  HR: 67 (04-29-22 @ 12:45) (58 - 69)  BP: 119/78 (04-29-22 @ 12:45) (97/56 - 185/97)  RR: 13 (04-29-22 @ 12:45) (13 - 15)  SpO2: 98% (04-29-22 @ 12:45) (98% - 100%)  Wt(kg): --    I&O's Summary    29 Apr 2022 07:01  -  29 Apr 2022 13:00  --------------------------------------------------------  IN: 1000 mL / OUT: 0 mL / NET: 1000 mL        CAPILLARY BLOOD GLUCOSE      POCT Blood Glucose.: 104 mg/dL (29 Apr 2022 12:26)      PHYSICAL EXAM:    GENERAL: Comfortable, no acute distress   HEAD:  Normocephalic, atraumatic  EYES: EOMI, PERRLA  HEENT: Moist mucous membranes  NECK: Supple, No JVD  NERVOUS SYSTEM:  Alert & Oriented X3, Motor Strength 5/5 B/L upper and lower extremities  CHEST/LUNG: Clear to auscultation bilaterally  HEART: Regular rate and rhythm  ABDOMEN: Soft, non tender, Nondistended, Bowel sounds present  GENITOURINARY: Voiding, no palpable bladder  EXTREMITIES:   No clubbing, cyanosis, or edema  MUSCULOSKELETAL- left knee dsg c/d/i  SKIN-no rash      LABS: pending        IMPRESSION: 81 yo male with above pmh a/w:    #left knee OA  # S/P left TKR    POD#0  pain control  PT eval  Bowel regimen  IVF's  Finish ABXs  DASH diet  PPI  VTE prophylaxis  monitor CBC/BMP    #depression  cont escitalopram    #Glaucoma  cont eye gtts      # HTN  cont  ccb, arb    #afib, paroxysmal, CV#4   cont asa, bb   not on any anticoagulation per cardio note    # HLD  cont statin    #DM, type 2   hold OHAS    # Vte prophylaxis  AC consult  Track  INC mobility      Thank you for the consult, will follow      
  HPI:      Patient is a 82y old  Male who presents with a chief complaint of OA left knee, S/P left TKR (2022 12:59)      Consulted by Dr.Scott Jones    for VTE prophylaxis, risk stratification, and anticoagulation management.    PAST MEDICAL & SURGICAL HISTORY:  HTN (hypertension)    HLD (hyperlipidemia)    Type II diabetes mellitus    Macular degeneration    Anxiety    Urinary incontinence    BPH (benign prostatic hyperplasia)    Nocturia    DNS (deviated nasal septum)  history of. Corrected with surgery    [place] (hard of hearing)  Bilateral hearing aids    Osteoarthritis  left knee    History of nasal septoplasty  due to deviated septum    S/P hernia repair    H/O laser photocoagulation of retina    History of prostate surgery  Grennlight TUVP 2019    History of colonoscopy  last done     Interval History  2022:Patient seen at bedside Discussed anticoagulation with Xarelto and the risks and benefits with patient. Patient denies any h/o VTE, stroke or bleeding. Emphasized the importance of taking medication daily to prevent VTE. Verbalized understanding and is in agreement with treatment plan.        CrCl:52  BMI:27.1  EBL:150ml    Caprini VTE Risk Score:CAPRINI SCORE  AGE RELATED RISK FACTORS                                                       MOBILITY RELATED FACTORS  [ ] Age 41-60 years                                            (1 Point)                  [ ] Bed rest /restricted mobility                             (1 Point)  [ ] Age: 61-74 years                                           (2 Points)                [ ] Plaster cast                                                   (2 Points)  [x ] Age= 75 years                                              (3 Points)                 [ ] Bed bound for more than 72 hours                   (2 Points)    DISEASE RELATED RISK FACTORS                                               GENDER SPECIFIC FACTORS  [ ] Edema in the lower extremities                       (1 Point)           [ ] Pregnancy                                                            (1 Point)  [ ] Varicose veins                                               (1 Point)                  [ ] Post-partum < 6 weeks                                      (1 Point)             [x ] BMI > 25 Kg/m2                                            (1 Point)                  [ ] Hormonal therapy or oral contraception       (1 Point)                 [ ] Sepsis (in the previous month)                        (1 Point)             [ ] History of pregnancy complications                (1Point)  [ ] Pneumonia or serious lung disease                                             [ ] Unexplained or recurrent  (=/>3), premature                                 (In the previous month)                               (1 Point)                birth with toxemia or growth-restricted infant (1 Point)  [ ] Abnormal pulmonary function test            (1 Point)                                   SURGERY RELATED RISK FACTORS  [ ] Acute myocardial infarction                       (1 Point)                  [ ]  Section                                         (1 Point)  [ ] Congestive heart failure (in the previous month) (1 Point)   [ ] Minor surgery   lasting <45 minutes       (1 Point)   [ ] Inflammatory bowel disease                             (1 Point)          [ ] Arthroscopic surgery                                  (2 Points)  [ ] Central venous access                                    (2 Points)            [ ] General surgery lasting >45 minutes      (2 Points)       [ ] Stroke (in the previous month)                  (5 Points)            [x ] Elective major lower extremity arthroplasty (5 Points)                                   [  ] Malignancy (present or past include skin melanoma                                          but exclude  basal skin cell)    (2 points)                                      TRAUMA RELATED RISK FACTORS                HEMATOLOGY RELATED FACTORS                                  [ ] Fracture of the hip, pelvis, or leg                       (5 Points)  [ ] Prior episodes of VTE                                     (3 Points)          [ ] Acute spinal cord injury (in the previous month)  (5 Points)  [ ] Positive family history for VTE                         (3 Points)       [ ] Paralysis (less than 1 month)                          (5 Points)  [ ] Prothrombin 55734 A                                      (3 Points)         [ ] Multiple Trauma (within 1month)                 (5Points)                                                                                                                                                                [ ] Factor V Leiden                                          (3 Points)                                OTHER RISK FACTORS                          [ ] Lupus anticoagulants                                     (3 Points)                       [ ] BMI > 40                          (1 Point)                                                         [ ] Anticardiolipin antibodies                                (3 Points)                   [ ] Smoking                              (1Point)                                                [ ] High homocysteine in the blood                      (3 Points)                [  ] Diabetes requiring insulin (1point)                         [ ] Other congenital or acquired thrombophilia       (3 Points)          [  ] Chemotherapy                   (1 Point)  [ ] Heparin induced thrombocytopenia                  (3 Points)             [  ] Blood Transfusion                (1 point)                                                                                                             Total Score [    9      ]                                                                                                                                                                                                                                                                                                                                                                                                                                         IMPROVE Bleeding Risk Score;2.5      Time In: 12:01  Time Out: 12:06    Falls Risk:   High (x  )  Mod (  )  Low (  )      FAMILY HISTORY:  Family hx of hypertension (Mother)      Denies any personal or familial history of clotting or bleeding disorders.    Allergies    No Known Drug Allergies  shellfish (Unknown)    Intolerances        REVIEW OF SYSTEMS    (  )Fever	     (  )Constipation	(  )SOB				(  )Headache	(  )Dysuria  (  )Chills	     (  )Melena	(  )Dyspnea present on exertion	                    (  )Dizziness                    (  )Polyuria  (  )Nausea	     (  )Hematochezia	(  )Cough			                    (  )Syncope   	(  )Hematuria  (  )Vomiting    (  )Chest Pain	(  )Wheezing			(  )Weakness  (  )Diarrhea     (  )Palpitations	(  )Anorexia			( x )joint pain    All  other review of systems negative: Yes    Vital Signs Last 24 Hrs  T(C): 36.3 (2022 15:00), Max: 36.3 (2022 09:17)  T(F): 97.4 (2022 15:00), Max: 97.4 (2022 12:20)  HR: 58 (2022 15:00) (57 - 85)  BP: 118/70 (2022 15:00) (97/56 - 185/97)  BP(mean): --  RR: 18 (2022 15:00) (13 - 21)  SpO2: 96% (2022 15:00) (95% - 100%)    PHYSICAL EXAM:    Constitutional: Appears Well    Neurological: A& O x 3    Skin: Warm    Respiratory and Thorax: normal effort; Breath sounds: normal; No rales/wheezing/rhonchi  	  Cardiovascular: S1, S2, regular, NMBR	    Gastrointestinal: BS + x 4Q, nontender	    Genitourinary:  Bladder nondistended, nontender    Musculoskeletal:   General Right:   no muscle/joint tenderness,   normal tone, no joint swelling,   ROM: full	    General Left:   + muscle/joint tenderness,   normal tone, no joint swelling,   ROM: limited      Knee:   Left: Incision: ; Dressing CDI;     Lower extrems:   Right: no calf tenderness              negative bear's sign               + pedal pulses    Left:   no calf tenderness              negative bear's sign               + pedal pulses                          12.9   8.56  )-----------( 177      ( 2022 12:45 )             39.3       04    136  |  106  |  28<H>  ----------------------------<  128<H>  4.4   |  25  |  1.14    Ca    8.9      2022 12:45    MEDICATIONS  (STANDING):  heparin   Injectable 5000 Unit(s) SubCutaneous once  lactated ringers. 1000 milliLiter(s) (75 mL/Hr) IV Continuous <Continuous>            DVT Prophylaxis:  LMWH                   (  )  Heparin SQ           ( x )  Coumadin             (  )  Xarelto                  (  )  Eliquis                   (  )  Venodynes           ( x )  Ambulation          (x  )  UFH                       (  )  Contraindicated  (  )  EC ASPIRIN       (  )

## 2022-04-29 NOTE — DISCHARGE NOTE PROVIDER - NSDCFUADDINST_GEN_ALL_CORE_FT
Discharge Instructions for Left Total Knee Arthroplasty:    1. ACTIVITY: WBAT, Rolling walker, Daily PT. Gentle ROM 0-full as tolerated. Walk plenty.  Knee Immobilizer at night time only for 3 weeks.  2. CALL FOR: fever over 101, wound redness, drainage or open area, calf pain/calf swelling.  3. BANDAGE: Change dressing to a new Mepilex Ag bandage POD7 (5/6/22). May change sooner if dressing saturated or falling off. DO NOT REMOVE BANDAGE TO CHECK WOUND ON INTAKE.  4. SHOWER: ok to shower, do not submerge, no baths, no hot tubs  5. STAPLES: RN Remove Staples POD14 (5/13/22).  6. DVT PE PROPHYLAXIS: Managed by Anticoag Team. See Med Rec.  7. GI: Continue Protonix daily while on Anticoagulant. an eRx has been sent to your pharmacy.  8. LABS:  INR if on Coumadin.  9. FOLLOW UP: Dr. Jones in 1 month. Call to schedule.  10. MEDICATIONS:  If going home, eRX sent to your pharmacy for .   11.**Call office if medications not covered under your insurance, especially BLOOD CLOT PREVENTION/anticoagulant medication.

## 2022-04-29 NOTE — PATIENT PROFILE ADULT - FALL HARM RISK - UNIVERSAL INTERVENTIONS
Bed in lowest position, wheels locked, appropriate side rails in place/Call bell, personal items and telephone in reach/Instruct patient to call for assistance before getting out of bed or chair/Non-slip footwear when patient is out of bed/[place] to call system/Physically safe environment - no spills, clutter or unnecessary equipment/Purposeful Proactive Rounding/Room/bathroom lighting operational, light cord in reach

## 2022-04-29 NOTE — PROGRESS NOTE ADULT - ASSESSMENT
A: 82M s/p Left TKA    P;  WBAT LLE   therapy   DVT ppx   post op abx  vneodynes  incentive spirometer  pain control   follow labs

## 2022-04-29 NOTE — PHYSICAL THERAPY INITIAL EVALUATION ADULT - PERTINENT HX OF CURRENT PROBLEM, REHAB EVAL
Pt w/ OA, with progressive L knee pain with ambulation, walking distances, climbing stairs and INC pain at night. He also h/o instability with his left knee, limited ROM. He failed the usual modalities. presents now for elective L TKA.

## 2022-04-29 NOTE — DISCHARGE NOTE PROVIDER - NSDCMRMEDTOKEN_GEN_ALL_CORE_FT
amLODIPine 5 mg oral tablet: 0.5 tab(s) orally once a day (in the evening)  aspirin 81 mg oral tablet: 1 tab(s) orally once a day (at bedtime). To continue as per  Cardiologiat, Dr. Escamilla  escitalopram 10 mg oral tablet: 1 tab(s) orally once a day (in the evening)  Flonase 50 mcg/inh nasal spray: 1 spray(s) nasal once a day, As Needed - for shortness of breath and/or wheezing  latanoprost 0.005% ophthalmic solution: 1 drop(s) to each affected eye once a day (in the evening)  losartan 50 mg oral tablet: 1 tab(s) orally once a day (in the morning)  metFORMIN 500 mg oral tablet, extended release: 1 tab(s) orally 2 times a day  Metoprolol Succinate  mg oral tablet, extended release: 1 tab(s) orally once a day (at bedtime)  oxyCODONE 5 mg oral tablet: 1 tab(s) orally every 4 hours, As Needed -for severe pain MDD:6  pantoprazole 40 mg oral delayed release tablet: 1 tab(s) orally once a day   PreserVision AREDS oral capsule: 1 cap(s) orally 2 times a day  rosuvastatin 10 mg oral tablet: 1 tab(s) orally once a day (in the morning)  Vitamin D3 50 mcg (2000 intl units) oral tablet: 1 tab(s) orally once a day (in the morning)

## 2022-04-29 NOTE — CONSULT NOTE ADULT - CONSULT REASON
Mom LM stated that she was taking patient to [facility], she was not sure if it was his anxiety, but wanted to rule out anything else medically also. Mom called back today, states that she gave patient Liothyronine and Clomipramine and he passed out, after coming to he passed out 5 more times, ER stated he was dehydrated and patient stated he had not eaten much lately. He was sent home and medically cleared. Mom states she is not going to give him any medication until she hears back from [address]. Mom informed that  is out of office until Monday due to holiday.
Patient seen for appointment on 11/29/2021
DVT/PE prophylaxis, risk stratification and Anticoagulation Management
medical management

## 2022-04-29 NOTE — CONSULT NOTE ADULT - NS ATTEND AMEND GEN_ALL_CORE FT
Patient is seen and examined at bedside  83yo/M with h/o diabetes, HTN, parox afib, hyperlipidemia, BPH presented for elective LT TKR  Seen postop, doing good  Up and ambulated with PT  Pain meds prn  Monitor HH  Incentive spiroemtry  Bowel regimen  Agree with above assessment and plan

## 2022-04-29 NOTE — DISCHARGE NOTE PROVIDER - CARE PROVIDER_API CALL
Jb Jones)  Orthopaedic Surgery  [address] [place] [address] [place] [address]  Phone: [phone]  Fax: [phone]  Follow Up Time:

## 2022-04-29 NOTE — PHYSICAL THERAPY INITIAL EVALUATION ADULT - ACTIVE RANGE OF MOTION EXAMINATION, REHAB EVAL
except L knee flex to 90/bilateral upper extremity Active ROM was WFL (within functional limits)/bilateral  lower extremity Active ROM was WFL (within functional limits)

## 2022-04-29 NOTE — DISCHARGE NOTE PROVIDER - HOSPITAL COURSE
H&P:  Pt is a 82y Male   PAST MEDICAL & SURGICAL HISTORY:  HTN (hypertension)    HLD (hyperlipidemia)    Type II diabetes mellitus    Macular degeneration    Anxiety    Urinary incontinence    BPH (benign prostatic hyperplasia)    Nocturia    DNS (deviated nasal septum)  history of. Corrected with surgery    [place] (hard of hearing)  Bilateral hearing aids    Osteoarthritis  left knee    History of nasal septoplasty  due to deviated septum    S/P hernia repair    H/O laser photocoagulation of retina    History of prostate surgery  [facility] 4/ 2019    History of colonoscopy  last done 2021         Now s/p Left Total Knee Arthroplasty. Pt is afebrile with stable vital signs. Pain is controlled. Alert and Oriented. Exam reveals intact EHL FHL TA GS, +DP. Dressing is clean and dry.    Hospital Course:  Patient presented to [facility] medically cleared for elective Left Total Knee Replacement Surgery, having failed outpatient conservative management. Prophylactic antibiotics were started before the procedure and continued for 24 hours. They were admitted after surgery to the orthopedic floor. There were no complications during the hospital stay. All home medications were continued.     **Pt received **U PRBC post op for Acute Blood Loss Anemia.    Routine consults were obtained from the Anticoagulation Team for DVT/PE prophylaxis, from Physical Therapy for twice daily PT, and from the Hospitalist for Medical Co-management. Patient was placed on anticoagulation. Pertinent home medications were continued. Daily labs were followed.      On POD 0 pt was stable overnight. No major event POD1-2. Pt received twice daily PT and a new dressing was applied prior to discharge. The plan is for DC to home with home PT** or to Rehab for ongoing PT**. The orthopedic Attending is aware and agrees.

## 2022-04-30 ENCOUNTER — TRANSCRIPTION ENCOUNTER (OUTPATIENT)
Age: 82
End: 2022-04-30

## 2022-04-30 VITALS — HEART RATE: 77 BPM | RESPIRATION RATE: 18 BRPM | DIASTOLIC BLOOD PRESSURE: 90 MMHG | SYSTOLIC BLOOD PRESSURE: 175 MMHG

## 2022-04-30 LAB
ANION GAP SERPL CALC-SCNC: 6 MMOL/L — SIGNIFICANT CHANGE UP (ref 5–17)
ANION GAP SERPL CALC-SCNC: 7 MMOL/L — SIGNIFICANT CHANGE UP (ref 5–17)
BUN SERPL-MCNC: 28 MG/DL — HIGH (ref 7–23)
BUN SERPL-MCNC: 31 MG/DL — HIGH (ref 7–23)
CALCIUM SERPL-MCNC: 8.9 MG/DL — SIGNIFICANT CHANGE UP (ref 8.5–10.1)
CALCIUM SERPL-MCNC: 9.1 MG/DL — SIGNIFICANT CHANGE UP (ref 8.5–10.1)
CHLORIDE SERPL-SCNC: 100 MMOL/L — SIGNIFICANT CHANGE UP (ref 96–108)
CHLORIDE SERPL-SCNC: 102 MMOL/L — SIGNIFICANT CHANGE UP (ref 96–108)
CO2 SERPL-SCNC: 23 MMOL/L — SIGNIFICANT CHANGE UP (ref 22–31)
CO2 SERPL-SCNC: 24 MMOL/L — SIGNIFICANT CHANGE UP (ref 22–31)
CREAT SERPL-MCNC: 1.44 MG/DL — HIGH (ref 0.5–1.3)
CREAT SERPL-MCNC: 1.55 MG/DL — HIGH (ref 0.5–1.3)
EGFR: 44 ML/MIN/1.73M2 — LOW
EGFR: 49 ML/MIN/1.73M2 — LOW
GLUCOSE SERPL-MCNC: 172 MG/DL — HIGH (ref 70–99)
GLUCOSE SERPL-MCNC: 205 MG/DL — HIGH (ref 70–99)
HCT VFR BLD CALC: 35.3 % — LOW (ref 39–50)
HGB BLD-MCNC: 11.9 G/DL — LOW (ref 13–17)
MCHC RBC-ENTMCNC: 30.8 PG — SIGNIFICANT CHANGE UP (ref 27–34)
MCHC RBC-ENTMCNC: 33.7 GM/DL — SIGNIFICANT CHANGE UP (ref 32–36)
MCV RBC AUTO: 91.5 FL — SIGNIFICANT CHANGE UP (ref 80–100)
PLATELET # BLD AUTO: 156 K/UL — SIGNIFICANT CHANGE UP (ref 150–400)
POTASSIUM SERPL-MCNC: 4.5 MMOL/L — SIGNIFICANT CHANGE UP (ref 3.5–5.3)
POTASSIUM SERPL-MCNC: 4.6 MMOL/L — SIGNIFICANT CHANGE UP (ref 3.5–5.3)
POTASSIUM SERPL-SCNC: 4.5 MMOL/L — SIGNIFICANT CHANGE UP (ref 3.5–5.3)
POTASSIUM SERPL-SCNC: 4.6 MMOL/L — SIGNIFICANT CHANGE UP (ref 3.5–5.3)
RBC # BLD: 3.86 M/UL — LOW (ref 4.2–5.8)
RBC # FLD: 13 % — SIGNIFICANT CHANGE UP (ref 10.3–14.5)
SODIUM SERPL-SCNC: 130 MMOL/L — LOW (ref 135–145)
SODIUM SERPL-SCNC: 132 MMOL/L — LOW (ref 135–145)
WBC # BLD: 13.21 K/UL — HIGH (ref 3.8–10.5)
WBC # FLD AUTO: 13.21 K/UL — HIGH (ref 3.8–10.5)

## 2022-04-30 PROCEDURE — 99232 SBSQ HOSP IP/OBS MODERATE 35: CPT

## 2022-04-30 PROCEDURE — 99231 SBSQ HOSP IP/OBS SF/LOW 25: CPT

## 2022-04-30 RX ORDER — RIVAROXABAN 15 MG-20MG
1 KIT ORAL
Qty: 10 | Refills: 0
Start: 2022-04-30 | End: 2022-05-09

## 2022-04-30 RX ORDER — ASPIRIN/CALCIUM CARB/MAGNESIUM 324 MG
1 TABLET ORAL
Qty: 0 | Refills: 0 | DISCHARGE

## 2022-04-30 RX ORDER — SODIUM CHLORIDE 9 MG/ML
1000 INJECTION, SOLUTION INTRAVENOUS
Refills: 0 | Status: DISCONTINUED | OUTPATIENT
Start: 2022-04-30 | End: 2022-04-30

## 2022-04-30 RX ORDER — RIVAROXABAN 15 MG-20MG
10 KIT ORAL DAILY
Refills: 0 | Status: DISCONTINUED | OUTPATIENT
Start: 2022-04-30 | End: 2022-04-30

## 2022-04-30 RX ADMIN — Medication 100 MILLIGRAM(S): at 02:51

## 2022-04-30 RX ADMIN — RIVAROXABAN 10 MILLIGRAM(S): KIT at 12:05

## 2022-04-30 RX ADMIN — SIMETHICONE 80 MILLIGRAM(S): 80 TABLET, CHEWABLE ORAL at 00:00

## 2022-04-30 RX ADMIN — Medication 2: at 18:30

## 2022-04-30 RX ADMIN — Medication 3: at 12:44

## 2022-04-30 RX ADMIN — ESCITALOPRAM OXALATE 10 MILLIGRAM(S): 10 TABLET, FILM COATED ORAL at 12:04

## 2022-04-30 RX ADMIN — Medication 100 MILLIGRAM(S): at 12:03

## 2022-04-30 RX ADMIN — Medication 975 MILLIGRAM(S): at 15:51

## 2022-04-30 RX ADMIN — Medication 101.6 MILLIGRAM(S): at 05:57

## 2022-04-30 RX ADMIN — Medication 1 TABLET(S): at 12:04

## 2022-04-30 RX ADMIN — Medication 975 MILLIGRAM(S): at 05:54

## 2022-04-30 RX ADMIN — AMLODIPINE BESYLATE 2.5 MILLIGRAM(S): 2.5 TABLET ORAL at 15:51

## 2022-04-30 RX ADMIN — PANTOPRAZOLE SODIUM 40 MILLIGRAM(S): 20 TABLET, DELAYED RELEASE ORAL at 12:05

## 2022-04-30 RX ADMIN — Medication 325 MILLIGRAM(S): at 12:04

## 2022-04-30 RX ADMIN — SODIUM CHLORIDE 125 MILLILITER(S): 9 INJECTION, SOLUTION INTRAVENOUS at 11:57

## 2022-04-30 RX ADMIN — Medication 1: at 08:51

## 2022-04-30 RX ADMIN — Medication 1 MILLIGRAM(S): at 12:05

## 2022-04-30 RX ADMIN — Medication 5 MILLIGRAM(S): at 00:00

## 2022-04-30 NOTE — DISCHARGE NOTE NURSING/CASE MANAGEMENT/SOCIAL WORK - PATIENT PORTAL LINK FT
You can access the FollowMyHealth Patient Portal offered by [facility] by registering at the following website: [email or web address]. By joining [facility]’s FollowMyHealth portal, you will also be able to view your health information using other applications (apps) compatible with our system.

## 2022-04-30 NOTE — PROGRESS NOTE ADULT - ASSESSMENT
A: 82M s/p Left TKA POD1    P;  WBAT LLE   therapy   DVT ppx   post op abx  vneodynes  incentive spirometer  pain control   follow labs   Plan for home today after AC Scripts sent

## 2022-04-30 NOTE — PROGRESS NOTE ADULT - SUBJECTIVE AND OBJECTIVE BOX
PCP:  Dr Bret Jones    CHIEF COMPLAINT: left knee OA    HPI: 82M, pmh of HTN, HLD, DMII, PAF on asa, depression, BPH, depression, Glaucoma, macular degeneration, OA who is admitted for elective left knee replacement after failing outpt conservative measures. Hospitalist service consulted for medical comanagement.   pt seen and examined this am. Felt well. Wants to go home. No difficulty voiding. states he's not drinking water bc he burps after doing so.   No sob/chest pain. tolerating po.       REVIEW OF SYSTEMS:   All 10 systems reviewed in detailed and found to be negative with the exception of what has already been described above    Vital Signs Last 24 Hrs  T(C): 36.7 (30 Apr 2022 08:18), Max: 36.9 (30 Apr 2022 00:00)  T(F): 98.1 (30 Apr 2022 08:18), Max: 98.4 (30 Apr 2022 00:00)  HR: 63 (30 Apr 2022 12:00) (57 - 86)  BP: 148/62 (30 Apr 2022 12:00) (110/72 - 152/78)  RR: 20 (30 Apr 2022 12:00) (16 - 20)  SpO2: 96% (30 Apr 2022 08:18) (96% - 100%)    PHYSICAL EXAM:    GENERAL: Comfortable, no acute distress   HEAD:  Normocephalic, atraumatic  EYES: EOMI, PERRLA  HEENT: dry mucous membranes  NECK: Supple, No JVD  NERVOUS SYSTEM:  Alert & Oriented X3, Motor Strength 5/5 B/L upper and lower extremities  CHEST/LUNG: Clear to auscultation bilaterally  HEART: Regular rate and rhythm  ABDOMEN: Soft, non tender, Nondistended, Bowel sounds present  GENITOURINARY: Voiding, no palpable bladder  EXTREMITIES:   No clubbing, cyanosis, or edema  MUSCULOSKELETAL- left knee dsg c/d/i  SKIN-no rash    LABS:                        11.9   13.21 )-----------( 156      ( 30 Apr 2022 08:03 )             35.3     04-30    132<L>  |  102  |  31<H>  ----------------------------<  172<H>  4.6   |  23  |  1.55<H>    Ca    8.9      30 Apr 2022 08:03    MEDICATIONS  (STANDING):  acetaminophen     Tablet .. 975 milliGRAM(s) Oral every 8 hours  amLODIPine   Tablet 2.5 milliGRAM(s) Oral daily  atorvastatin 40 milliGRAM(s) Oral at bedtime  dextrose 5%. 1000 milliLiter(s) (50 mL/Hr) IV Continuous <Continuous>  dextrose 5%. 1000 milliLiter(s) (100 mL/Hr) IV Continuous <Continuous>  dextrose 50% Injectable 25 Gram(s) IV Push once  dextrose 50% Injectable 12.5 Gram(s) IV Push once  dextrose 50% Injectable 25 Gram(s) IV Push once  escitalopram 10 milliGRAM(s) Oral daily  ferrous    sulfate 325 milliGRAM(s) Oral daily  folic acid 1 milliGRAM(s) Oral daily  glucagon  Injectable 1 milliGRAM(s) IntraMuscular once  insulin lispro (ADMELOG) corrective regimen sliding scale   SubCutaneous three times a day before meals  insulin lispro (ADMELOG) corrective regimen sliding scale   SubCutaneous at bedtime  lactated ringers. 1000 milliLiter(s) (125 mL/Hr) IV Continuous <Continuous>  metoprolol succinate  milliGRAM(s) Oral daily  multivitamin 1 Tablet(s) Oral daily  pantoprazole    Tablet 40 milliGRAM(s) Oral before breakfast  rivaroxaban 10 milliGRAM(s) Oral daily  senna 2 Tablet(s) Oral at bedtime    MEDICATIONS  (PRN):  dextrose Oral Gel 15 Gram(s) Oral once PRN Blood Glucose LESS THAN 70 milliGRAM(s)/deciliter  HYDROmorphone  Injectable 0.5 milliGRAM(s) SubCutaneous every 4 hours PRN Severe Pain (7 - 10)  ondansetron Injectable 4 milliGRAM(s) IV Push every 6 hours PRN Nausea and/or Vomiting  ondansetron Injectable 4 milliGRAM(s) IV Push once PRN Nausea and/or Vomiting  oxyCODONE    IR 10 milliGRAM(s) Oral once PRN Severe Pain (7 - 10)  oxyCODONE    IR 5 milliGRAM(s) Oral every 3 hours PRN Mild Pain (1 - 3)  oxyCODONE    IR 10 milliGRAM(s) Oral every 3 hours PRN Moderate Pain (4 - 6)        ASSESSMENT AND PLAN:  81 yo male with above pmh a/w:    #left knee OA  # S/P left TKR pod#1  -Pain control  -physical therapy  -incentive spirometry  -bowel regimen.     #Acute renal failure:  -ivf.   -dc celebrex/losartan  -repeat bmp.     #Acute blood loss anemia:  -d/t surgery  -no need for transfusion at this time.     #Paroxysmal atrial fibrillation.  -cont asa, bb  - not on any anticoagulation per cardio note    # HTN  -continue bb, norvasc  -hold arb    # HLD  -statin    #DM, type 2  -SS    #depression  -cont escitalopram    #Glaucoma  cont eye gtts    # Vte prophylaxis  -xarelto per a/c services    
  HPI:      Patient is a 82y old  Male who presents with a chief complaint of OA left knee, S/P left TKR (2022 12:59)      Consulted by Dr.Scott Jones    for VTE prophylaxis, risk stratification, and anticoagulation management.    PAST MEDICAL & SURGICAL HISTORY:  HTN (hypertension)    HLD (hyperlipidemia)    Type II diabetes mellitus    Macular degeneration    Anxiety    Urinary incontinence    BPH (benign prostatic hyperplasia)    Nocturia    DNS (deviated nasal septum)  history of. Corrected with surgery    [place] (hard of hearing)  Bilateral hearing aids    Osteoarthritis  left knee    History of nasal septoplasty  due to deviated septum    S/P hernia repair    H/O laser photocoagulation of retina    History of prostate surgery  [facility]    History of colonoscopy  last done     Interval History  2022:Patient seen at bedside Discussed anticoagulation with Xarelto and the risks and benefits with patient. Patient denies any h/o VTE, stroke or bleeding. Emphasized the importance of taking medication daily to prevent VTE. Verbalized understanding and is in agreement with treatment plan.  2022:Patient seen at bedside [place], reiterated the need for AC with  anticoagulation with Xarelto, no concerns ambulating with PT , possible home tomorrow        CrCl:52  BMI:27.1  EBL:150ml    Caprini VTE Risk Score:CAPRINI SCORE  AGE RELATED RISK FACTORS                                                       MOBILITY RELATED FACTORS  [ ] Age 41-60 years                                            (1 Point)                  [ ] Bed rest /restricted mobility                             (1 Point)  [ ] Age: 61-74 years                                           (2 Points)                [ ] Plaster cast                                                   (2 Points)  [x ] Age= 75 years                                              (3 Points)                 [ ] Bed bound for more than 72 hours                   (2 Points)    DISEASE RELATED RISK FACTORS                                               GENDER SPECIFIC FACTORS  [ ] Edema in the lower extremities                       (1 Point)           [ ] Pregnancy                                                            (1 Point)  [ ] Varicose veins                                               (1 Point)                  [ ] Post-partum < 6 weeks                                      (1 Point)             [x ] BMI > 25 Kg/m2                                            (1 Point)                  [ ] Hormonal therapy or oral contraception       (1 Point)                 [ ] Sepsis (in the previous month)                        (1 Point)             [ ] History of pregnancy complications                (1Point)  [ ] Pneumonia or serious lung disease                                             [ ] Unexplained or recurrent  (=/>3), premature                                 (In the previous month)                               (1 Point)                birth with toxemia or growth-restricted infant (1 Point)  [ ] Abnormal pulmonary function test            (1 Point)                                   SURGERY RELATED RISK FACTORS  [ ] Acute myocardial infarction                       (1 Point)                  [ ]  Section                                         (1 Point)  [ ] Congestive heart failure (in the previous month) (1 Point)   [ ] Minor surgery   lasting <45 minutes       (1 Point)   [ ] Inflammatory bowel disease                             (1 Point)          [ ] Arthroscopic surgery                                  (2 Points)  [ ] Central venous access                                    (2 Points)            [ ] General surgery lasting >45 minutes      (2 Points)       [ ] Stroke (in the previous month)                  (5 Points)            [x ] Elective major lower extremity arthroplasty (5 Points)                                   [  ] Malignancy (present or past include skin melanoma                                          but exclude  basal skin cell)    (2 points)                                      TRAUMA RELATED RISK FACTORS                HEMATOLOGY RELATED FACTORS                                  [ ] Fracture of the hip, pelvis, or leg                       (5 Points)  [ ] Prior episodes of VTE                                     (3 Points)          [ ] Acute spinal cord injury (in the previous month)  (5 Points)  [ ] Positive family history for VTE                         (3 Points)       [ ] Paralysis (less than 1 month)                          (5 Points)  [ ] Prothrombin 64342 A                                      (3 Points)         [ ] Multiple Trauma (within 1month)                 (5Points)                                                                                                                                                                [ ] Factor V Leiden                                          (3 Points)                                OTHER RISK FACTORS                          [ ] Lupus anticoagulants                                     (3 Points)                       [ ] BMI > 40                          (1 Point)                                                         [ ] Anticardiolipin antibodies                                (3 Points)                   [ ] Smoking                              (1Point)                                                [ ] High homocysteine in the blood                      (3 Points)                [  ] Diabetes requiring insulin (1point)                         [ ] Other congenital or acquired thrombophilia       (3 Points)          [  ] Chemotherapy                   (1 Point)  [ ] Heparin induced thrombocytopenia                  (3 Points)             [  ] Blood Transfusion                (1 point)                                                                                                             Total Score [    9      ]                                                                                                                                                                                                                                                                                                                                                                                                                                         IMPROVE Bleeding Risk Score;2.5      Time In: 12:01  Time Out: 12:06    Falls Risk:   High (x  )  Mod (  )  Low (  )      FAMILY HISTORY:  Family hx of hypertension (Mother)      Denies any personal or familial history of clotting or bleeding disorders.    Allergies    No Known Drug Allergies  shellfish (Unknown)    Intolerances        REVIEW OF SYSTEMS    (  )Fever	     (  )Constipation	(  )SOB				(  )Headache	(  )Dysuria  (  )Chills	     (  )Melena	(  )Dyspnea present on exertion	                    (  )Dizziness                    (  )Polyuria  (  )Nausea	     (  )Hematochezia	(  )Cough			                    (  )Syncope   	(  )Hematuria  (  )Vomiting    (  )Chest Pain	(  )Wheezing			(  )Weakness  (  )Diarrhea     (  )Palpitations	(  )Anorexia			( x )joint pain    All  other review of systems negative: Yes    Vital Signs Last 24 Hrs  T(C): 36.7 (2022 08:18), Max: 36.9 (2022 00:00)  T(F): 98.1 (2022 08:18), Max: 98.4 (2022 00:00)  HR: 66 (2022 08:18) (57 - 86)  BP: 110/72 (2022 08:18) (97/56 - 152/78)  BP(mean): --  RR: 16 (2022 08:18) (13 - 21)  SpO2: 96% (2022 08:18) (95% - 100%)  PHYSICAL EXAM:    Constitutional: Appears Well    Neurological: A& O x 3    Skin: Warm    Respiratory and Thorax: normal effort; Breath sounds: normal; No rales/wheezing/rhonchi  	  Cardiovascular: S1, S2, regular, NMBR	    Gastrointestinal: BS + x 4Q, nontender	    Genitourinary:  Bladder nondistended, nontender    Musculoskeletal:   General Right:   no muscle/joint tenderness,   normal tone, no joint swelling,   ROM: full	    General Left:   + muscle/joint tenderness,   normal tone, no joint swelling,   ROM: limited      Knee:   Left: Incision: ; Dressing CDI;     Lower extrems:   Right: no calf tenderness              negative bear's sign               + pedal pulses    Left:   no calf tenderness              negative bear's sign               + pedal pulses                        11.9   13.21 )-----------( 156      ( 2022 08:03 )             35.3       04-30    132<L>  |  102  |  31<H>  ----------------------------<  172<H>  4.6   |  23  |  1.55<H>    Ca    8.9      2022 08:03                                12.9   8.56  )-----------( 177      ( 2022 12:45 )             39.3       04-    136  |  106  |  28<H>  ----------------------------<  128<H>  4.4   |  25  |  1.14    Ca    8.9      2022 12:45    MEDICATIONS  (STANDING):  acetaminophen     Tablet .. 975 milliGRAM(s) Oral every 8 hours  amLODIPine   Tablet 2.5 milliGRAM(s) Oral daily  atorvastatin 40 milliGRAM(s) Oral at bedtime  dextrose 5%. 1000 milliLiter(s) (50 mL/Hr) IV Continuous <Continuous>  dextrose 5%. 1000 milliLiter(s) (100 mL/Hr) IV Continuous <Continuous>  dextrose 50% Injectable 25 Gram(s) IV Push once  dextrose 50% Injectable 12.5 Gram(s) IV Push once  dextrose 50% Injectable 25 Gram(s) IV Push once  escitalopram 10 milliGRAM(s) Oral daily  ferrous    sulfate 325 milliGRAM(s) Oral daily  folic acid 1 milliGRAM(s) Oral daily  glucagon  Injectable 1 milliGRAM(s) IntraMuscular once  insulin lispro (ADMELOG) corrective regimen sliding scale   SubCutaneous three times a day before meals  insulin lispro (ADMELOG) corrective regimen sliding scale   SubCutaneous at bedtime  lactated ringers. 1000 milliLiter(s) (75 mL/Hr) IV Continuous <Continuous>  metoprolol succinate  milliGRAM(s) Oral daily  multivitamin 1 Tablet(s) Oral daily  pantoprazole    Tablet 40 milliGRAM(s) Oral before breakfast  rivaroxaban 10 milliGRAM(s) Oral daily  senna 2 Tablet(s) Oral at bedtime        DVT Prophylaxis:  LMWH                   (  )  Heparin SQ           ( x )  Coumadin             (  )  Xarelto                  (  )  Eliquis                   (  )  Venodynes           ( x )  Ambulation          (x  )  UFH                       (  )  Contraindicated  (  )  EC ASPIRIN       (  )          
pt seen at bedside resting in NAD, min pain to left knee controlled with medication, denies N/T LLE no other complaints                           12.9   8.56  )-----------( 177      ( 29 Apr 2022 12:45 )             39.3     Pe left knee  dressing c/d/i   compartments soft non tender  FROM ankle and toes  + EHL FHL GS TA   SILT   Dp intact     
pt seen at bedside resting in NAD, min pain to left knee controlled with medication, denies N/T LLE no other complaints     Vital Signs Last 24 Hrs  T(C): 36.7 (30 Apr 2022 05:00), Max: 36.9 (30 Apr 2022 00:00)  T(F): 98.1 (30 Apr 2022 05:00), Max: 98.4 (30 Apr 2022 00:00)  HR: 86 (30 Apr 2022 05:00) (57 - 86)  BP: 119/66 (30 Apr 2022 05:00) (97/56 - 185/97)  BP(mean): --  RR: 16 (30 Apr 2022 05:00) (13 - 21)  SpO2: 97% (30 Apr 2022 05:00) (95% - 100%)    LABS:                        12.9   8.56  )-----------( 177      ( 29 Apr 2022 12:45 )             39.3     29 Apr 2022 12:45    136    |  106    |  28     ----------------------------<  128    4.4     |  25     |  1.14     Ca    8.9        29 Apr 2022 12:45      Pe left knee  dressing c/d/i   compartments soft non tender  FROM ankle and toes  + EHL FHL GS TA   SILT   Dp intact

## 2022-04-30 NOTE — PROGRESS NOTE ADULT - ASSESSMENT
Patient is a 82y old  Male who presents with a chief complaint of OA left knee, S/P left TKR (29 Apr 2022 12:59). Consulted by Dr.Scott Jones    for VTE prophylaxis, risk stratification, and anticoagulation management. Patient is  high risk for VTE due to age, tourniquet use, surgery, immobility, and BMI, low bleeding risk.  D/W pt use of Xarelto, risks vs benefits and pt is in agreement to use Xarelto    Plan  Start Xarelto 10 mg po today and cont x 12 days last dose 5/11/2022  Protonix 40 mg po daily while on Xarelto  Daily CBC, BMP  BLE venodynes  INC mobility as tasia  will follow  Dispo:Home

## 2022-05-01 ENCOUNTER — EMERGENCY (EMERGENCY)
Facility: HOSPITAL | Age: 82
LOS: 0 days | Discharge: NURSING, REHAB OR LONG-TERM CARE | End: 2022-05-02
Attending: EMERGENCY MEDICINE
Payer: MEDICARE

## 2022-05-01 VITALS
DIASTOLIC BLOOD PRESSURE: 77 MMHG | TEMPERATURE: 99 F | HEIGHT: 65 IN | SYSTOLIC BLOOD PRESSURE: 160 MMHG | HEART RATE: 74 BPM | RESPIRATION RATE: 18 BRPM | OXYGEN SATURATION: 98 %

## 2022-05-01 DIAGNOSIS — Z98.890 OTHER SPECIFIED POSTPROCEDURAL STATES: Chronic | ICD-10-CM

## 2022-05-01 DIAGNOSIS — N40.0 BENIGN PROSTATIC HYPERPLASIA WITHOUT LOWER URINARY TRACT SYMPTOMS: ICD-10-CM

## 2022-05-01 DIAGNOSIS — Z79.01 LONG TERM (CURRENT) USE OF ANTICOAGULANTS: ICD-10-CM

## 2022-05-01 DIAGNOSIS — Z96.652 PRESENCE OF LEFT ARTIFICIAL KNEE JOINT: ICD-10-CM

## 2022-05-01 DIAGNOSIS — M25.562 PAIN IN LEFT KNEE: ICD-10-CM

## 2022-05-01 DIAGNOSIS — H35.30 UNSPECIFIED MACULAR DEGENERATION: ICD-10-CM

## 2022-05-01 DIAGNOSIS — I48.0 PAROXYSMAL ATRIAL FIBRILLATION: ICD-10-CM

## 2022-05-01 DIAGNOSIS — Z20.822 CONTACT WITH AND (SUSPECTED) EXPOSURE TO COVID-19: ICD-10-CM

## 2022-05-01 DIAGNOSIS — Z79.82 LONG TERM (CURRENT) USE OF ASPIRIN: ICD-10-CM

## 2022-05-01 DIAGNOSIS — I10 ESSENTIAL (PRIMARY) HYPERTENSION: ICD-10-CM

## 2022-05-01 DIAGNOSIS — G89.18 OTHER ACUTE POSTPROCEDURAL PAIN: ICD-10-CM

## 2022-05-01 DIAGNOSIS — R41.82 ALTERED MENTAL STATUS, UNSPECIFIED: ICD-10-CM

## 2022-05-01 DIAGNOSIS — Z82.49 FAMILY HISTORY OF ISCHEMIC HEART DISEASE AND OTHER DISEASES OF THE CIRCULATORY SYSTEM: ICD-10-CM

## 2022-05-01 DIAGNOSIS — R53.1 WEAKNESS: ICD-10-CM

## 2022-05-01 DIAGNOSIS — E11.9 TYPE 2 DIABETES MELLITUS WITHOUT COMPLICATIONS: ICD-10-CM

## 2022-05-01 DIAGNOSIS — M19.90 UNSPECIFIED OSTEOARTHRITIS, UNSPECIFIED SITE: ICD-10-CM

## 2022-05-01 DIAGNOSIS — E78.5 HYPERLIPIDEMIA, UNSPECIFIED: ICD-10-CM

## 2022-05-01 DIAGNOSIS — F41.9 ANXIETY DISORDER, UNSPECIFIED: ICD-10-CM

## 2022-05-01 DIAGNOSIS — Z91.013 ALLERGY TO SEAFOOD: ICD-10-CM

## 2022-05-01 PROBLEM — J34.2 DEVIATED NASAL SEPTUM: Chronic | Status: ACTIVE | Noted: 2022-04-20

## 2022-05-01 PROBLEM — R32 UNSPECIFIED URINARY INCONTINENCE: Chronic | Status: ACTIVE | Noted: 2022-04-20

## 2022-05-01 PROBLEM — H91.90 UNSPECIFIED HEARING LOSS, UNSPECIFIED EAR: Chronic | Status: ACTIVE | Noted: 2022-04-20

## 2022-05-01 PROBLEM — R35.1 NOCTURIA: Chronic | Status: ACTIVE | Noted: 2022-04-20

## 2022-05-01 LAB
ALBUMIN SERPL ELPH-MCNC: 3.6 G/DL — SIGNIFICANT CHANGE UP (ref 3.3–5)
ALP SERPL-CCNC: 47 U/L — SIGNIFICANT CHANGE UP (ref 40–120)
ALT FLD-CCNC: 18 U/L — SIGNIFICANT CHANGE UP (ref 12–78)
ANION GAP SERPL CALC-SCNC: 11 MMOL/L — SIGNIFICANT CHANGE UP (ref 5–17)
AST SERPL-CCNC: 37 U/L — SIGNIFICANT CHANGE UP (ref 15–37)
BASOPHILS # BLD AUTO: 0 K/UL — SIGNIFICANT CHANGE UP (ref 0–0.2)
BASOPHILS NFR BLD AUTO: 0 % — SIGNIFICANT CHANGE UP (ref 0–2)
BILIRUB SERPL-MCNC: 0.8 MG/DL — SIGNIFICANT CHANGE UP (ref 0.2–1.2)
BUN SERPL-MCNC: 31 MG/DL — HIGH (ref 7–23)
CALCIUM SERPL-MCNC: 9.4 MG/DL — SIGNIFICANT CHANGE UP (ref 8.5–10.1)
CHLORIDE SERPL-SCNC: 100 MMOL/L — SIGNIFICANT CHANGE UP (ref 96–108)
CO2 SERPL-SCNC: 25 MMOL/L — SIGNIFICANT CHANGE UP (ref 22–31)
CREAT SERPL-MCNC: 1.26 MG/DL — SIGNIFICANT CHANGE UP (ref 0.5–1.3)
EGFR: 57 ML/MIN/1.73M2 — LOW
EOSINOPHIL # BLD AUTO: 0 K/UL — SIGNIFICANT CHANGE UP (ref 0–0.5)
EOSINOPHIL NFR BLD AUTO: 0 % — SIGNIFICANT CHANGE UP (ref 0–6)
GLUCOSE BLDC GLUCOMTR-MCNC: 177 MG/DL — HIGH (ref 70–99)
GLUCOSE SERPL-MCNC: 190 MG/DL — HIGH (ref 70–99)
HCT VFR BLD CALC: 29.8 % — LOW (ref 39–50)
HGB BLD-MCNC: 10 G/DL — LOW (ref 13–17)
LYMPHOCYTES # BLD AUTO: 2.82 K/UL — SIGNIFICANT CHANGE UP (ref 1–3.3)
LYMPHOCYTES # BLD AUTO: 20 % — SIGNIFICANT CHANGE UP (ref 13–44)
MCHC RBC-ENTMCNC: 31.1 PG — SIGNIFICANT CHANGE UP (ref 27–34)
MCHC RBC-ENTMCNC: 33.6 GM/DL — SIGNIFICANT CHANGE UP (ref 32–36)
MCV RBC AUTO: 92.5 FL — SIGNIFICANT CHANGE UP (ref 80–100)
MONOCYTES # BLD AUTO: 1.83 K/UL — HIGH (ref 0–0.9)
MONOCYTES NFR BLD AUTO: 13 % — SIGNIFICANT CHANGE UP (ref 2–14)
NEUTROPHILS # BLD AUTO: 9.44 K/UL — HIGH (ref 1.8–7.4)
NEUTROPHILS NFR BLD AUTO: 67 % — SIGNIFICANT CHANGE UP (ref 43–77)
NRBC # BLD: SIGNIFICANT CHANGE UP /100 WBCS (ref 0–0)
PLATELET # BLD AUTO: 143 K/UL — LOW (ref 150–400)
POTASSIUM SERPL-MCNC: 4.4 MMOL/L — SIGNIFICANT CHANGE UP (ref 3.5–5.3)
POTASSIUM SERPL-SCNC: 4.4 MMOL/L — SIGNIFICANT CHANGE UP (ref 3.5–5.3)
PROT SERPL-MCNC: 6.8 GM/DL — SIGNIFICANT CHANGE UP (ref 6–8.3)
RBC # BLD: 3.22 M/UL — LOW (ref 4.2–5.8)
RBC # FLD: 13.2 % — SIGNIFICANT CHANGE UP (ref 10.3–14.5)
SODIUM SERPL-SCNC: 136 MMOL/L — SIGNIFICANT CHANGE UP (ref 135–145)
WBC # BLD: 14.09 K/UL — HIGH (ref 3.8–10.5)
WBC # FLD AUTO: 14.09 K/UL — HIGH (ref 3.8–10.5)

## 2022-05-01 PROCEDURE — 99219: CPT

## 2022-05-01 PROCEDURE — 99283 EMERGENCY DEPT VISIT LOW MDM: CPT | Mod: 25

## 2022-05-01 PROCEDURE — 80053 COMPREHEN METABOLIC PANEL: CPT

## 2022-05-01 PROCEDURE — 97116 GAIT TRAINING THERAPY: CPT | Mod: GP

## 2022-05-01 PROCEDURE — 85025 COMPLETE CBC W/AUTO DIFF WBC: CPT

## 2022-05-01 PROCEDURE — 97161 PT EVAL LOW COMPLEX 20 MIN: CPT | Mod: GP

## 2022-05-01 PROCEDURE — 97162 PT EVAL MOD COMPLEX 30 MIN: CPT | Mod: GP

## 2022-05-01 PROCEDURE — G0378: CPT

## 2022-05-01 PROCEDURE — U0005: CPT

## 2022-05-01 PROCEDURE — 36415 COLL VENOUS BLD VENIPUNCTURE: CPT

## 2022-05-01 PROCEDURE — 99285 EMERGENCY DEPT VISIT HI MDM: CPT

## 2022-05-01 PROCEDURE — 82962 GLUCOSE BLOOD TEST: CPT

## 2022-05-01 PROCEDURE — 97530 THERAPEUTIC ACTIVITIES: CPT | Mod: GP

## 2022-05-01 PROCEDURE — U0003: CPT

## 2022-05-01 RX ORDER — GLUCAGON INJECTION, SOLUTION 0.5 MG/.1ML
1 INJECTION, SOLUTION SUBCUTANEOUS ONCE
Refills: 0 | Status: DISCONTINUED | OUTPATIENT
Start: 2022-05-01 | End: 2022-05-02

## 2022-05-01 RX ORDER — DEXTROSE 50 % IN WATER 50 %
12.5 SYRINGE (ML) INTRAVENOUS ONCE
Refills: 0 | Status: DISCONTINUED | OUTPATIENT
Start: 2022-05-01 | End: 2022-05-02

## 2022-05-01 RX ORDER — ONDANSETRON 8 MG/1
4 TABLET, FILM COATED ORAL EVERY 8 HOURS
Refills: 0 | Status: DISCONTINUED | OUTPATIENT
Start: 2022-05-01 | End: 2022-05-02

## 2022-05-01 RX ORDER — SODIUM CHLORIDE 9 MG/ML
1000 INJECTION, SOLUTION INTRAVENOUS
Refills: 0 | Status: DISCONTINUED | OUTPATIENT
Start: 2022-05-01 | End: 2022-05-02

## 2022-05-01 RX ORDER — ACETAMINOPHEN 500 MG
650 TABLET ORAL EVERY 6 HOURS
Refills: 0 | Status: DISCONTINUED | OUTPATIENT
Start: 2022-05-01 | End: 2022-05-02

## 2022-05-01 RX ORDER — AMLODIPINE BESYLATE 2.5 MG/1
0.5 TABLET ORAL
Qty: 0 | Refills: 0 | DISCHARGE

## 2022-05-01 RX ORDER — PANTOPRAZOLE SODIUM 20 MG/1
40 TABLET, DELAYED RELEASE ORAL
Refills: 0 | Status: DISCONTINUED | OUTPATIENT
Start: 2022-05-01 | End: 2022-05-02

## 2022-05-01 RX ORDER — ESCITALOPRAM OXALATE 10 MG/1
10 TABLET, FILM COATED ORAL DAILY
Refills: 0 | Status: DISCONTINUED | OUTPATIENT
Start: 2022-05-01 | End: 2022-05-02

## 2022-05-01 RX ORDER — INSULIN LISPRO 100/ML
VIAL (ML) SUBCUTANEOUS
Refills: 0 | Status: DISCONTINUED | OUTPATIENT
Start: 2022-05-01 | End: 2022-05-02

## 2022-05-01 RX ORDER — DEXTROSE 50 % IN WATER 50 %
25 SYRINGE (ML) INTRAVENOUS ONCE
Refills: 0 | Status: DISCONTINUED | OUTPATIENT
Start: 2022-05-01 | End: 2022-05-02

## 2022-05-01 RX ORDER — ATORVASTATIN CALCIUM 80 MG/1
20 TABLET, FILM COATED ORAL AT BEDTIME
Refills: 0 | Status: DISCONTINUED | OUTPATIENT
Start: 2022-05-01 | End: 2022-05-02

## 2022-05-01 RX ORDER — LANOLIN ALCOHOL/MO/W.PET/CERES
3 CREAM (GRAM) TOPICAL AT BEDTIME
Refills: 0 | Status: DISCONTINUED | OUTPATIENT
Start: 2022-05-01 | End: 2022-05-02

## 2022-05-01 RX ORDER — OXYCODONE HYDROCHLORIDE 5 MG/1
5 TABLET ORAL EVERY 6 HOURS
Refills: 0 | Status: DISCONTINUED | OUTPATIENT
Start: 2022-05-01 | End: 2022-05-02

## 2022-05-01 RX ORDER — LATANOPROST 0.05 MG/ML
1 SOLUTION/ DROPS OPHTHALMIC; TOPICAL AT BEDTIME
Refills: 0 | Status: DISCONTINUED | OUTPATIENT
Start: 2022-05-01 | End: 2022-05-02

## 2022-05-01 RX ORDER — METOPROLOL TARTRATE 50 MG
100 TABLET ORAL DAILY
Refills: 0 | Status: DISCONTINUED | OUTPATIENT
Start: 2022-05-01 | End: 2022-05-02

## 2022-05-01 RX ORDER — DEXTROSE 50 % IN WATER 50 %
15 SYRINGE (ML) INTRAVENOUS ONCE
Refills: 0 | Status: DISCONTINUED | OUTPATIENT
Start: 2022-05-01 | End: 2022-05-02

## 2022-05-01 RX ORDER — OXYCODONE HYDROCHLORIDE 5 MG/1
5 TABLET ORAL ONCE
Refills: 0 | Status: DISCONTINUED | OUTPATIENT
Start: 2022-05-01 | End: 2022-05-01

## 2022-05-01 RX ORDER — FLUTICASONE PROPIONATE 50 MCG
1 SPRAY, SUSPENSION NASAL
Refills: 0 | Status: DISCONTINUED | OUTPATIENT
Start: 2022-05-01 | End: 2022-05-02

## 2022-05-01 RX ORDER — CHOLECALCIFEROL (VITAMIN D3) 125 MCG
2000 CAPSULE ORAL DAILY
Refills: 0 | Status: DISCONTINUED | OUTPATIENT
Start: 2022-05-01 | End: 2022-05-02

## 2022-05-01 RX ORDER — ASPIRIN/CALCIUM CARB/MAGNESIUM 324 MG
81 TABLET ORAL DAILY
Refills: 0 | Status: DISCONTINUED | OUTPATIENT
Start: 2022-05-01 | End: 2022-05-02

## 2022-05-01 RX ORDER — AMLODIPINE BESYLATE 2.5 MG/1
2.5 TABLET ORAL DAILY
Refills: 0 | Status: DISCONTINUED | OUTPATIENT
Start: 2022-05-01 | End: 2022-05-02

## 2022-05-01 RX ORDER — RIVAROXABAN 15 MG-20MG
10 KIT ORAL DAILY
Refills: 0 | Status: DISCONTINUED | OUTPATIENT
Start: 2022-05-01 | End: 2022-05-02

## 2022-05-01 RX ORDER — LOSARTAN POTASSIUM 100 MG/1
50 TABLET, FILM COATED ORAL DAILY
Refills: 0 | Status: DISCONTINUED | OUTPATIENT
Start: 2022-05-01 | End: 2022-05-02

## 2022-05-01 RX ADMIN — ATORVASTATIN CALCIUM 20 MILLIGRAM(S): 80 TABLET, FILM COATED ORAL at 22:06

## 2022-05-01 RX ADMIN — OXYCODONE HYDROCHLORIDE 5 MILLIGRAM(S): 5 TABLET ORAL at 18:29

## 2022-05-01 RX ADMIN — Medication 100 MILLIGRAM(S): at 18:24

## 2022-05-01 RX ADMIN — AMLODIPINE BESYLATE 2.5 MILLIGRAM(S): 2.5 TABLET ORAL at 18:23

## 2022-05-01 RX ADMIN — LATANOPROST 1 DROP(S): 0.05 SOLUTION/ DROPS OPHTHALMIC; TOPICAL at 22:51

## 2022-05-01 RX ADMIN — Medication 81 MILLIGRAM(S): at 18:23

## 2022-05-01 RX ADMIN — ESCITALOPRAM OXALATE 10 MILLIGRAM(S): 10 TABLET, FILM COATED ORAL at 18:24

## 2022-05-01 RX ADMIN — RIVAROXABAN 10 MILLIGRAM(S): KIT at 18:28

## 2022-05-01 RX ADMIN — Medication 2000 UNIT(S): at 18:24

## 2022-05-01 RX ADMIN — OXYCODONE HYDROCHLORIDE 5 MILLIGRAM(S): 5 TABLET ORAL at 18:25

## 2022-05-01 RX ADMIN — OXYCODONE HYDROCHLORIDE 5 MILLIGRAM(S): 5 TABLET ORAL at 13:31

## 2022-05-01 RX ADMIN — PANTOPRAZOLE SODIUM 40 MILLIGRAM(S): 20 TABLET, DELAYED RELEASE ORAL at 18:24

## 2022-05-01 RX ADMIN — Medication 1: at 22:10

## 2022-05-01 NOTE — H&P ADULT - ASSESSMENT
81 yo male with Hx. of HTN, DM2, HLD, PAF, BPH,  Glaucoma, Macular degeneration who underwent L TKR  on 4/29/22. No post op complications noted and he was discharged home on 4/30/22 because he did not want to to Rehab. The patient returned to the [facility] because he was too weak to ambulate, could not sleep at night because of pain, and had urine frequency. The patient consented to be placed in subacute rehab. Was evaluated by ortho in the ER .  assessment Dx:                       1. DJD L knee s/p TKA                       2. Weakness                        3. BPH                       4. DM                       5: HTN     Plan:    admit to medicine for observation  transfer to [facility] in am.                medications: as per med rec.                VTEP: on Xarelto                Labs: cbc,bmp               Consults: Ortho                 Advance Directive: full code,                                   81 yo male with Hx. of HTN, DM2, HLD, PAF, BPH,  Glaucoma, Macular degeneration who underwent L TKR  on 4/29/22. No post op complications noted and he was discharged home on 4/30/22 because he did not want to to Rehab. The patient returned to the [facility] because he was too weak to ambulate, could not sleep at night because of pain, and had urine frequency. The patient consented to be placed in subacute rehab. Was evaluated by ortho in the ER .  assessment Dx:                       1. DJD L knee s/p TKA                       2. Weakness                        3. Anemia post op                        4. mild leukocytosis                       5: HTN                        6. BPH                       7. DM2     Plan:    admit to medicine for observation  transfer to [facility] in am.                medications: as per med rec. Insulin coverage.               VTEP: on Xarelto                Labs: cbc,bmp               Consults: Ortho                 Advance Directive: full code,

## 2022-05-01 NOTE — CONSULT NOTE ADULT - SUBJECTIVE AND OBJECTIVE BOX
82M s/p L TKA by Dr. Jones on 4/29/22 who returns to  Emergency Department for continued left knee pain and rehab placement. Per patient, he has been having continued postoperative knee pain and was told by his home care nurse that he wasn't safe at home and should come back to hospital for rehab placement. He was discharged from hospital yesterday. Patient has been ambulating with walker since surgery. Otherwise denies falls/trauma, numbness/tingling, weakness, any other acute orthopedic complaints.     Vital Signs Last 24 Hrs  T(C): 37.3 (01 May 2022 12:21), Max: 37.3 (01 May 2022 12:21)  T(F): 99.2 (01 May 2022 12:21), Max: 99.2 (01 May 2022 12:21)  HR: 68 (01 May 2022 12:21) (68 - 77)  BP: 157/82 (01 May 2022 12:21) (157/82 - 175/90)  BP(mean): 105 (01 May 2022 12:21) (105 - 105)  RR: 18 (01 May 2022 12:21) (18 - 18)  SpO2: 95% (01 May 2022 12:21) (95% - 98%)                          11.9   13.21 )-----------( 156      ( 30 Apr 2022 08:03 )             35.3       Exam:  General: Awake alert no acute distress  LLE:   Well healing surgical incision with [place] in place; Dressing with minimal strike through; No surrounding erythema/streaking/discharge or evidence of infection  Mild Noemi-incisional TTP appreciated. Diffuse knee TTP. No bony TTP appreciated  Unable to SLR  +Gsc/TA/EHL/FHL, SILT  DP pulse palpable  Compartments soft and compressible  No calf TTP bilaterally

## 2022-05-01 NOTE — H&P ADULT - NSHPREVIEWOFSYSTEMS_GEN_ALL_CORE
ROS:   Eyes: no changes in vision  ENT/Mouth: no changes    Cardiovascular: no chest pain    Respiratory: no SOB    Gastrointestinal: no diarrhea, no nausea, no vomiting    Genitourinary: no dysuria    Breast: no pain    Musculoskeletal:  L knee pain, weakness,     Integumentary: no itching    Neurological: No Headache, no tremor,    Endocrine: no excessive thirst,     Allergic/Immunologic: no itching

## 2022-05-01 NOTE — H&P ADULT - NSHPLABSRESULTS_GEN_ALL_CORE
LABS: All Labs Reviewed:                          10.0   14.09 )-----------( 143      ( 01 May 2022 13:16 )             29.8       05-01    136  |  100  |  31<H>  ----------------------------<  190<H>  4.4   |  25  |  1.26    Ca    9.4      01 May 2022 13:16    TPro  6.8  /  Alb  3.6  /  TBili  0.8  /  DBili  x   /  AST  37  /  ALT  18  /  AlkPhos  47  05-01    LIVER FUNCTIONS - ( 01 May 2022 13:16 )  Alb: 3.6 g/dL / Pro: 6.8 gm/dL / ALK PHOS: 47 U/L / ALT: 18 U/L / AST: 37 U/L / GGT: x

## 2022-05-01 NOTE — ED CLERICAL - NS ED CLERK NOTE PRE-ARRIVAL INFORMATION; ADDITIONAL PRE-ARRIVAL INFORMATION
This patient is enrolled in the comprehensive joint replacement (CJR) program and has active care navigation.  This patient can be followed up by the care navigation team within 24 hours. To arrange close follow-up or to obtain additional clinical information about this patient, please call the contact number above. Please call the hospitalist for medical consultation ([phone]) PRIOR to admission or observation.  The hospitalist is part of the care team and can assist in acute medical management. Please call the orthopedic team () for ALL patients who require admission.

## 2022-05-01 NOTE — ED ADULT NURSE REASSESSMENT NOTE - NS ED NURSE REASSESS COMMENT FT1
Pt A+Ox4. VSS. Temp 99.0, will monitor. LLE pain much improved after oxycodone. Left knee replacement aquacel dssg with small amt old drainage noted. Ace wrap applied as ordered. Left thigh with eccymosis from knee to groin scattered, slightly swollen. +PP. Incontinant of large amt of urine, skin care given, skin intact. Monitored closely. Daughter at bedside. Pt and daughter instructed in plan of care.

## 2022-05-01 NOTE — ED ADULT NURSE NOTE - OBJECTIVE STATEMENT
patient states he had surgery on his left knee by Dr Jones two days ago.  he states the pain is very severe and he is unable to amublate.  His visiting nurse called the ER and stated patient is unable to care for himself at home and needs transfer to Rehab.  He denies fever/chills.

## 2022-05-01 NOTE — H&P ADULT - HISTORY OF PRESENT ILLNESS
HPI: The patient is an 81 yo male with Hx. of HTN, DM2, HLD, PAF, BPH,  Glaucoma, Macular degeneration who underwent L TKR  on 4/29/22. No post op complications noted and he was discharged home on 4/30/22 because he did not want to to Rehab. The patient returned to the [facility] because he was too weak to ambulate, could not sleep at night because of pain, and had urine frequency. The patient consented to be placed in subacute rehab. Was evaluated by ortho in the ER .    PMHx: DJD , HTN, HLD, BPH, macular degeneration, DM2,     PSHx: retinal surgery, , prostate  TUVP, , hernia repair    Family Hx: Mother had HTN     Social Hx.: not smoking, no alcohol use

## 2022-05-01 NOTE — ED PROVIDER NOTE - NSICDXPASTMEDICALHX_GEN_ALL_CORE_FT
PAST MEDICAL HISTORY:  Anxiety     BPH (benign prostatic hyperplasia)     DNS (deviated nasal septum) history of. Corrected with surgery    HLD (hyperlipidemia)     [place] (hard of hearing) Bilateral hearing aids    HTN (hypertension)     Macular degeneration     Nocturia     Osteoarthritis left knee    Type II diabetes mellitus     Urinary incontinence

## 2022-05-01 NOTE — H&P ADULT - NSHPPHYSICALEXAM_GEN_ALL_CORE
Physical Exam: Vital Signs Last 24 Hrs  T(C): 37.2 (01 May 2022 18:19), Max: 37.3 (01 May 2022 12:21)  T(F): 98.9 (01 May 2022 18:19), Max: 99.2 (01 May 2022 12:21)  HR: 88 (01 May 2022 18:19) (68 - 88)  BP: 152/80 (01 May 2022 18:19) (146/80 - 160/77)  BP(mean): 105 (01 May 2022 12:21) (105 - 105)  RR: 18 (01 May 2022 18:19) (18 - 18)  SpO2: 96% (01 May 2022 18:19) (95% - 98%)        HEENT: PRRL EOMI    MOUTH/TEETH/GUMS: Clear    NECK: no JVD    LUNGS: Clear    HEART: S1,S2 RR    ABDOMEN: soft nontender    EXTREMITIES:  no pedal edema    MUSCULOSKELETAL: s/p L TKA , has ace wrap, dressing clean     NEURO: no tremor, no focal signs.    SKIN: no rash    : CVA negative,

## 2022-05-01 NOTE — ED ADULT TRIAGE NOTE - CHIEF COMPLAINT QUOTE
Pt arrives to ED from home complaining of left leg and altered mental status. Pt s/p left knee surgery on Friday. As per home care nurse patient needs rehab placement, unsafe at home. pt took oxycodone for pain last night.

## 2022-05-01 NOTE — CONSULT NOTE ADULT - ASSESSMENT
82M s/p L TKA by Dr. Jones 4/29/22 returning for pain/rehab placement    Plan:  Medical management appreciated  FU Labs  No need for new imaging at this time given discharged yesterday and no falls/trauma at home, normal postoperative pain  Pain control PRN  Continue postoperative DVT ppx with Xarelto/A81  WBAT/PT/OT  Dispo: [facility] placement  No acute orthopedic surgical intervention indicated at this time. Orthopedically stable for discharge to [facility]. Patient to follow up with Dr. Jones as outpatient for further evaluation and treatment  Will discuss with attending Dr. Jones and advise if any changes to plan

## 2022-05-01 NOTE — ED PROVIDER NOTE - OBJECTIVE STATEMENT
81 y/o male with a PMHx of osteoarthritis, DNS, nocturia, BPH, [place], urinary incontinence, anxiety, macular degeneration, DM type II, HLD, HTN presents to the ED c/o left knee pain. Pt had knee surgery on Friday. Pt has been having trouble managing around home since surgery. Pt has no other complaints at this time.

## 2022-05-02 ENCOUNTER — TRANSCRIPTION ENCOUNTER (OUTPATIENT)
Age: 82
End: 2022-05-02

## 2022-05-02 ENCOUNTER — APPOINTMENT (OUTPATIENT)
Dept: CARDIOLOGY | Facility: CLINIC | Age: 82
End: 2022-05-02

## 2022-05-02 VITALS
RESPIRATION RATE: 16 BRPM | OXYGEN SATURATION: 98 % | SYSTOLIC BLOOD PRESSURE: 113 MMHG | DIASTOLIC BLOOD PRESSURE: 68 MMHG | HEART RATE: 79 BPM | TEMPERATURE: 98 F

## 2022-05-02 LAB
ALBUMIN SERPL ELPH-MCNC: 3.3 G/DL — SIGNIFICANT CHANGE UP (ref 3.3–5)
ALP SERPL-CCNC: 48 U/L — SIGNIFICANT CHANGE UP (ref 40–120)
ALT FLD-CCNC: 17 U/L — SIGNIFICANT CHANGE UP (ref 12–78)
ANION GAP SERPL CALC-SCNC: 8 MMOL/L — SIGNIFICANT CHANGE UP (ref 5–17)
AST SERPL-CCNC: 35 U/L — SIGNIFICANT CHANGE UP (ref 15–37)
BILIRUB SERPL-MCNC: 0.9 MG/DL — SIGNIFICANT CHANGE UP (ref 0.2–1.2)
BUN SERPL-MCNC: 28 MG/DL — HIGH (ref 7–23)
CALCIUM SERPL-MCNC: 9.2 MG/DL — SIGNIFICANT CHANGE UP (ref 8.5–10.1)
CHLORIDE SERPL-SCNC: 101 MMOL/L — SIGNIFICANT CHANGE UP (ref 96–108)
CO2 SERPL-SCNC: 25 MMOL/L — SIGNIFICANT CHANGE UP (ref 22–31)
CREAT SERPL-MCNC: 1.38 MG/DL — HIGH (ref 0.5–1.3)
EGFR: 51 ML/MIN/1.73M2 — LOW
GLUCOSE BLDC GLUCOMTR-MCNC: 190 MG/DL — HIGH (ref 70–99)
GLUCOSE SERPL-MCNC: 174 MG/DL — HIGH (ref 70–99)
POTASSIUM SERPL-MCNC: 4.1 MMOL/L — SIGNIFICANT CHANGE UP (ref 3.5–5.3)
POTASSIUM SERPL-SCNC: 4.1 MMOL/L — SIGNIFICANT CHANGE UP (ref 3.5–5.3)
PROT SERPL-MCNC: 6.6 GM/DL — SIGNIFICANT CHANGE UP (ref 6–8.3)
SODIUM SERPL-SCNC: 134 MMOL/L — LOW (ref 135–145)

## 2022-05-02 PROCEDURE — 99217: CPT

## 2022-05-02 RX ORDER — ACETAMINOPHEN 500 MG
2 TABLET ORAL
Qty: 0 | Refills: 0 | DISCHARGE
Start: 2022-05-02

## 2022-05-02 RX ORDER — MULTIVIT-MIN/FERROUS GLUCONATE 9 MG/15 ML
1 LIQUID (ML) ORAL
Qty: 0 | Refills: 0 | DISCHARGE

## 2022-05-02 RX ORDER — RIVAROXABAN 15 MG-20MG
1 KIT ORAL
Qty: 0 | Refills: 0 | DISCHARGE
Start: 2022-05-02

## 2022-05-02 RX ADMIN — Medication 30 MILLILITER(S): at 02:23

## 2022-05-02 RX ADMIN — RIVAROXABAN 10 MILLIGRAM(S): KIT at 09:38

## 2022-05-02 RX ADMIN — OXYCODONE HYDROCHLORIDE 5 MILLIGRAM(S): 5 TABLET ORAL at 06:10

## 2022-05-02 RX ADMIN — Medication 650 MILLIGRAM(S): at 12:33

## 2022-05-02 RX ADMIN — Medication 1: at 08:26

## 2022-05-02 RX ADMIN — ESCITALOPRAM OXALATE 10 MILLIGRAM(S): 10 TABLET, FILM COATED ORAL at 09:39

## 2022-05-02 RX ADMIN — Medication 81 MILLIGRAM(S): at 09:38

## 2022-05-02 RX ADMIN — OXYCODONE HYDROCHLORIDE 5 MILLIGRAM(S): 5 TABLET ORAL at 05:39

## 2022-05-02 RX ADMIN — Medication 2000 UNIT(S): at 09:39

## 2022-05-02 RX ADMIN — LOSARTAN POTASSIUM 50 MILLIGRAM(S): 100 TABLET, FILM COATED ORAL at 09:38

## 2022-05-02 RX ADMIN — AMLODIPINE BESYLATE 2.5 MILLIGRAM(S): 2.5 TABLET ORAL at 09:39

## 2022-05-02 RX ADMIN — Medication 30 MILLILITER(S): at 09:47

## 2022-05-02 RX ADMIN — Medication 100 MILLIGRAM(S): at 09:39

## 2022-05-02 RX ADMIN — Medication 3: at 12:34

## 2022-05-02 RX ADMIN — Medication 3 MILLIGRAM(S): at 02:23

## 2022-05-02 NOTE — DISCHARGE NOTE NURSING/CASE MANAGEMENT/SOCIAL WORK - NSDCPNINST_GEN_ALL_CORE
Call MD for any increase in pain, numbness, tingling; fever over 101F; swelling, redness, oozing at incision site; pain in calf.

## 2022-05-02 NOTE — DISCHARGE NOTE PROVIDER - NSDCMRMEDTOKEN_GEN_ALL_CORE_FT
acetaminophen 325 mg oral tablet: 2 tab(s) orally every 6 hours, As needed, Temp greater or equal to 38C (100.4F), Mild Pain (1 - 3)  amLODIPine 2.5 mg oral tablet: 1 tab(s) orally once a day  aspirin 81 mg oral tablet: 1 tab(s) orally once a day (at bedtime). Hold while on Xarelto  escitalopram 10 mg oral tablet: 1 tab(s) orally once a day (in the evening)  Flonase 50 mcg/inh nasal spray: 1 spray(s) nasal once a day, As Needed - for shortness of breath and/or wheezing  latanoprost 0.005% ophthalmic solution: 1 drop(s) to each affected eye once a day (in the evening)  losartan 50 mg oral tablet: 1 tab(s) orally once a day (in the morning)  metFORMIN 500 mg oral tablet, extended release: 1 tab(s) orally 2 times a day  Metoprolol Succinate  mg oral tablet, extended release: 1 tab(s) orally once a day (at bedtime)  oxyCODONE 5 mg oral tablet: 1 tab(s) orally every 4 hours, As Needed -for severe pain MDD:6  pantoprazole 40 mg oral delayed release tablet: 1 tab(s) orally once a day   rivaroxaban 10 mg oral tablet: 1 tab(s) orally once a day last dose 5/11/22  rosuvastatin 10 mg oral tablet: 1 tab(s) orally once a day (in the morning)  Vitamin D3 50 mcg (2000 intl units) oral tablet: 1 tab(s) orally once a day (in the morning)

## 2022-05-02 NOTE — PHYSICAL THERAPY INITIAL EVALUATION ADULT - GENERAL OBSERVATIONS, REHAB EVAL
The pt was received on 2N, supine, eager to get OOB and ambulate in order to go to [facility]. The pt's left knee was bandaged in ACE wrap, pt's dtr was present bedside.

## 2022-05-02 NOTE — DISCHARGE NOTE PROVIDER - NSDCFUADDINST_GEN_ALL_CORE_FT
Discharge Instructions for Total Knee Arthroplasty    1. ACTIVITY: WBAT, Rolling walker, Daily PT. Gentle ROM 0-full as tolerated. Walk plenty.  Knee Immobilizer at night time only for 3 weeks.  2. CALL WITH: fever over 101, wound redness, drainage or open area, calf pain/calf swelling  3. BANDAGE: POD7 (MAY6) place a new Mepilex Ag over incision. May change sooner ONLY if leaks or falls off. DO NOT REMOVE BANDAGE TO CHECK WOUND ON INTAKE.  4. SHOWER:  Remove tan ACE wrap and throw it away. Shower OK. No Tub baths.  5. STAPLES: RN Remove Staples POD14 (MAY13)   6. BLOOD CLOT PREVENTION: Managed by Anticoag Team. See Med Rec.  7. GI: Continue Protonix daily while on Anticoagulant. an eRx has been sent to your pharmacy.  8. LABS:  N/A  9. FOLLOW UP: Dr. Jones in 1 month. Call to schedule.   10. MEDICATIONS:  If going home, eRX sent to your pharmacy for .   11.**Call office if medications not covered under your insurance , especially BLOOD CLOT PREVENTION/anticoagulant medication.

## 2022-05-02 NOTE — DISCHARGE NOTE NURSING/CASE MANAGEMENT/SOCIAL WORK - NSDCPEFALRISK_GEN_ALL_CORE
For information on Fall & Injury Prevention, visit: [email or web address] OR  [email or web address] OR  https://www.cdc.gov/steadi/patient.html

## 2022-05-02 NOTE — DISCHARGE NOTE PROVIDER - NSDCCPCAREPLAN_GEN_ALL_CORE_FT
PRINCIPAL DISCHARGE DIAGNOSIS  Diagnosis: S/P total knee replacement, left  Assessment and Plan of Treatment: WBAT with PT  Outpatient f/u with DR Jones

## 2022-05-02 NOTE — DISCHARGE NOTE PROVIDER - HOSPITAL COURSE
Patient is s/p LT TKR on 4/29/22, was initially discharged home but came back as could not ambulate and now stable for discharge to rehab

## 2022-05-02 NOTE — DISCHARGE NOTE NURSING/CASE MANAGEMENT/SOCIAL WORK - PATIENT PORTAL LINK FT
You can access the FollowMyHealth Patient Portal offered by [facility] by registering at the following website: [email or web address]. By joining [facility]’s FollowMyHealth portal, you will also be able to view your health information using other applications (apps) compatible with our system.

## 2022-05-02 NOTE — DISCHARGE NOTE PROVIDER - CARE PROVIDER_API CALL
Jb Jones (MD)  Orthopaedic Surgery  [address] [place] [address] [place] [address]  Phone: [phone]  Fax: [phone]  Follow Up Time: 7-10 Days

## 2022-05-02 NOTE — PROGRESS NOTE ADULT - ASSESSMENT
82M s/p L TKA by Dr. Jones 4/29/22 returning for pain/rehab placement    Plan:  Medical management appreciated  FU AM Labs  Pain control PRN  DVT ppx with Xarelto/A81  WBAT/PT/OT  Dispo: [facility] placement  No acute orthopedic surgical intervention indicated at this time. Orthopedically stable for discharge to [facility]. Patient to follow up with Dr. Jones as outpatient for further evaluation and treatment  Will discuss with attending Dr. Jones and advise if any changes to plan

## 2022-05-02 NOTE — PROGRESS NOTE ADULT - SUBJECTIVE AND OBJECTIVE BOX
PCP:  Dr Bret Jones    CHIEF COMPLAINT: left knee OA    HPI: 82M, pmh of HTN, HLD, DMII, PAF on asa, depression, BPH, depression, Glaucoma, macular degeneration, OA who is admitted for elective left knee replacement after failing outpt conservative measures. Hospitalist service consulted for medical comanagement.   pt seen and examined this am. Felt well. Wants to go home. No difficulty voiding. states he's not drinking water bc he burps after doing so.   No sob/chest pain. tolerating po.     5/2/22- up and ambulated with PT, going to rehab    REVIEW OF SYSTEMS:   All 10 systems reviewed in detailed and found to be negative with the exception of what has already been described above    Vital Signs Last 24 Hrs  T(C): 36.9 (02 May 2022 08:54), Max: 37.3 (01 May 2022 20:35)  T(F): 98.4 (02 May 2022 08:54), Max: 99.2 (01 May 2022 20:35)  HR: 79 (02 May 2022 08:54) (75 - 88)  BP: 113/68 (02 May 2022 08:54) (113/68 - 152/80)  BP(mean): --  RR: 16 (02 May 2022 08:54) (16 - 18)  SpO2: 98% (02 May 2022 08:54) (95% - 98%)    PHYSICAL EXAM:  GENERAL: Comfortable, no acute distress   HEAD:  Normocephalic, atraumatic  EYES: EOMI, PERRLA  HEENT: dry mucous membranes  NECK: Supple, No JVD  NERVOUS SYSTEM:  Alert & Oriented X3, Motor Strength 5/5 B/L upper and lower extremities  CHEST/LUNG: Clear to auscultation bilaterally  HEART: Regular rate and rhythm  ABDOMEN: Soft, non tender, Nondistended, Bowel sounds present  GENITOURINARY: Voiding, no palpable bladder  EXTREMITIES:   No clubbing, cyanosis, or edema  MUSCULOSKELETAL- left knee dsg c/d/i  SKIN-no rash    LABS:                              10.0   14.09 )-----------( 143      ( 01 May 2022 13:16 )             29.8     02 May 2022 07:31    134    |  101    |  28     ----------------------------<  174    4.1     |  25     |  1.38     Ca    9.2        02 May 2022 07:31    TPro  6.6    /  Alb  3.3    /  TBili  0.9    /  DBili  x      /  AST  35     /  ALT  17     /  AlkPhos  48     02 May 2022 07:31    LIVER FUNCTIONS - ( 02 May 2022 07:31 )  Alb: 3.3 g/dL / Pro: 6.6 gm/dL / ALK PHOS: 48 U/L / ALT: 17 U/L / AST: 35 U/L / GGT: x           CAPILLARY BLOOD GLUCOSE  POCT Blood Glucose.: 251 mg/dL (02 May 2022 11:57)  POCT Blood Glucose.: 190 mg/dL (02 May 2022 08:21)  POCT Blood Glucose.: 177 mg/dL (01 May 2022 22:05)    MEDICATIONS  (STANDING):  amLODIPine   Tablet 2.5 milliGRAM(s) Oral daily  aspirin enteric coated 81 milliGRAM(s) Oral daily  atorvastatin 20 milliGRAM(s) Oral at bedtime  cholecalciferol 2000 Unit(s) Oral daily  dextrose 5%. 1000 milliLiter(s) (100 mL/Hr) IV Continuous <Continuous>  dextrose 5%. 1000 milliLiter(s) (50 mL/Hr) IV Continuous <Continuous>  dextrose 50% Injectable 25 Gram(s) IV Push once  dextrose 50% Injectable 12.5 Gram(s) IV Push once  dextrose 50% Injectable 25 Gram(s) IV Push once  escitalopram 10 milliGRAM(s) Oral daily  glucagon  Injectable 1 milliGRAM(s) IntraMuscular once  insulin lispro (ADMELOG) corrective regimen sliding scale   SubCutaneous three times a day before meals  latanoprost 0.005% Ophthalmic Solution 1 Drop(s) Both EYES at bedtime  losartan 50 milliGRAM(s) Oral daily  metoprolol succinate  milliGRAM(s) Oral daily  pantoprazole    Tablet 40 milliGRAM(s) Oral before breakfast  rivaroxaban 10 milliGRAM(s) Oral daily    MEDICATIONS  (PRN):  acetaminophen     Tablet .. 650 milliGRAM(s) Oral every 6 hours PRN Temp greater or equal to 38C (100.4F), Mild Pain (1 - 3)  aluminum hydroxide/magnesium hydroxide/simethicone Suspension 30 milliLiter(s) Oral every 4 hours PRN Dyspepsia  dextrose Oral Gel 15 Gram(s) Oral once PRN Blood Glucose LESS THAN 70 milliGRAM(s)/deciliter  fluticasone propionate 50 MICROgram(s)/spray Nasal Spray 1 Spray(s) Both Nostrils two times a day PRN for shortness of breath and/or wheezing  melatonin 3 milliGRAM(s) Oral at bedtime PRN Insomnia  ondansetron Injectable 4 milliGRAM(s) IV Push every 8 hours PRN Nausea and/or Vomiting  oxyCODONE    IR 5 milliGRAM(s) Oral every 6 hours PRN Moderate Pain (4 - 6)    ASSESSMENT AND PLAN:  #left knee OA  # S/P left TKR POD#3  -Pain control  -physical therapy  -incentive spirometry  -bowel regimen.     #Acute blood loss anemia:  -d/t surgery  -no need for transfusion at this time.     #Paroxysmal atrial fibrillation.  -cont asa, bb  - not on any anticoagulation prior to admission    # HTN  -continue bb, norvasc  -hold arb    # HLD  -statin    #DM, type 2  -SS    #Likely CKD2  Baseline appears between 1.1-1.4    #depression  -cont escitalopram    #Glaucoma  cont eye gtts    # Vte prophylaxis  -xarelto per a/c services    #Dispo- rehab today. DC time 45 mins    
Patient seen and examined. Reporting knee pain but overall controlled. Otherwise feeling well. Denies cp sob n/v any other acute complaints.     Vital Signs Last 24 Hrs  T(C): 37.1 (01 May 2022 23:53), Max: 37.3 (01 May 2022 12:21)  T(F): 98.7 (01 May 2022 23:53), Max: 99.2 (01 May 2022 12:21)  HR: 75 (01 May 2022 23:53) (68 - 88)  BP: 137/73 (01 May 2022 23:53) (120/75 - 160/77)  BP(mean): 105 (01 May 2022 12:21) (105 - 105)  RR: 16 (01 May 2022 23:53) (16 - 18)  SpO2: 97% (01 May 2022 23:53) (95% - 98%)                          10.0   14.09 )-----------( 143      ( 01 May 2022 13:16 )             29.8         Exam:  General: Awake alert no acute distress  LLE:   Well healing surgical incision with [place] in place; No surrounding erythema/streaking/discharge or evidence of infection  Dressing with minimal strike through  Mild Noemi-incisional TTP appreciated. Diffuse knee TTP. No bony TTP appreciated  Unable to SLR  +Gsc/TA/EHL/FHL, SILT  DP pulse palpable  Compartments soft and compressible  No calf TTP bilaterally

## 2022-05-02 NOTE — DISCHARGE NOTE PROVIDER - DISCHARGE DIET
Consistent Carbohydrate Diabetic Diets Regular Diet - No restrictions/Consistent Carbohydrate Diabetic Diets

## 2022-05-02 NOTE — PHYSICAL THERAPY INITIAL EVALUATION ADULT - PLANNED THERAPY INTERVENTIONS, PT EVAL
The pt was limited by generalized weakness and the patient requires clear verbal instructions./bed mobility training/gait training/postural re-education/transfer training

## 2022-05-02 NOTE — PHYSICAL THERAPY INITIAL EVALUATION ADULT - PERTINENT HX OF CURRENT PROBLEM, REHAB EVAL
83 yo male with Hx. of HTN, DM2, HLD, PAF, BPH,  Glaucoma, Macular degeneration who underwent L TKR  on 4/29/22. No post op complications noted and he was discharged home on 4/30/22 because he did not want to to Rehab. The patient returned to the ER because he was too weak to ambulate, could not sleep at night because of pain, and had urine frequency. The patient consented to be placed in subacute rehab.

## 2022-05-05 DIAGNOSIS — I42.2 OTHER HYPERTROPHIC CARDIOMYOPATHY: ICD-10-CM

## 2022-05-05 DIAGNOSIS — Z79.82 LONG TERM (CURRENT) USE OF ASPIRIN: ICD-10-CM

## 2022-05-05 DIAGNOSIS — I48.0 PAROXYSMAL ATRIAL FIBRILLATION: ICD-10-CM

## 2022-05-05 DIAGNOSIS — Z91.013 ALLERGY TO SEAFOOD: ICD-10-CM

## 2022-05-05 DIAGNOSIS — E11.9 TYPE 2 DIABETES MELLITUS WITHOUT COMPLICATIONS: ICD-10-CM

## 2022-05-05 DIAGNOSIS — M17.12 UNILATERAL PRIMARY OSTEOARTHRITIS, LEFT KNEE: ICD-10-CM

## 2022-05-05 DIAGNOSIS — E78.00 PURE HYPERCHOLESTEROLEMIA, UNSPECIFIED: ICD-10-CM

## 2022-05-05 DIAGNOSIS — Z79.01 LONG TERM (CURRENT) USE OF ANTICOAGULANTS: ICD-10-CM

## 2022-05-05 DIAGNOSIS — I10 ESSENTIAL (PRIMARY) HYPERTENSION: ICD-10-CM

## 2022-05-05 DIAGNOSIS — I34.0 NONRHEUMATIC MITRAL (VALVE) INSUFFICIENCY: ICD-10-CM

## 2022-05-05 DIAGNOSIS — Z79.84 LONG TERM (CURRENT) USE OF ORAL HYPOGLYCEMIC DRUGS: ICD-10-CM

## 2022-08-17 NOTE — PHYSICAL THERAPY INITIAL EVALUATION ADULT - THERAPY FREQUENCY, PT EVAL
BID/daily Bexarotene Pregnancy And Lactation Text: This medication is Pregnancy Category X and should not be given to women who are pregnant or may become pregnant. This medication should not be used if you are breast feeding.

## 2022-10-27 ENCOUNTER — NON-APPOINTMENT (OUTPATIENT)
Age: 82
End: 2022-10-27

## 2022-10-27 DIAGNOSIS — M25.562 PAIN IN LEFT KNEE: ICD-10-CM

## 2022-10-27 DIAGNOSIS — I10 ESSENTIAL (PRIMARY) HYPERTENSION: ICD-10-CM

## 2022-10-27 DIAGNOSIS — Z87.438 PERSONAL HISTORY OF OTHER DISEASES OF MALE GENITAL ORGANS: ICD-10-CM

## 2022-10-27 DIAGNOSIS — E11.9 TYPE 2 DIABETES MELLITUS W/OUT COMPLICATIONS: ICD-10-CM

## 2022-10-27 RX ORDER — LOSARTAN POTASSIUM 50 MG/1
50 TABLET, FILM COATED ORAL DAILY
Refills: 0 | Status: ACTIVE | COMMUNITY

## 2022-10-27 RX ORDER — ESCITALOPRAM OXALATE 10 MG/1
10 TABLET, FILM COATED ORAL DAILY
Refills: 0 | Status: ACTIVE | COMMUNITY

## 2022-10-27 RX ORDER — ASPIRIN ENTERIC COATED TABLETS 81 MG 81 MG/1
81 TABLET, DELAYED RELEASE ORAL DAILY
Refills: 0 | Status: ACTIVE | COMMUNITY

## 2022-10-27 RX ORDER — VIT C/E/ZN/COPPR/LUTEIN/ZEAXAN 250MG-90MG
CAPSULE ORAL
Refills: 0 | Status: ACTIVE | COMMUNITY

## 2022-10-27 RX ORDER — FLUTICASONE PROPIONATE 50 UG/1
50 SPRAY, METERED NASAL DAILY
Refills: 0 | Status: ACTIVE | COMMUNITY

## 2022-10-27 RX ORDER — ROSUVASTATIN CALCIUM 10 MG/1
10 TABLET, FILM COATED ORAL DAILY
Refills: 0 | Status: ACTIVE | COMMUNITY

## 2022-10-27 RX ORDER — METFORMIN ER 500 MG 500 MG/1
500 TABLET ORAL TWICE DAILY
Refills: 0 | Status: ACTIVE | COMMUNITY
Start: 2021-02-23

## 2022-10-27 RX ORDER — LATANOPROST/PF 0.005 %
0.01 DROPS OPHTHALMIC (EYE) DAILY
Refills: 0 | Status: ACTIVE | COMMUNITY

## 2022-10-27 RX ORDER — MULTIVIT-MIN/FOLIC/VIT K/LYCOP 400-300MCG
50 MCG TABLET ORAL
Refills: 0 | Status: ACTIVE | COMMUNITY

## 2022-10-27 RX ORDER — METOPROLOL SUCCINATE 100 MG/1
100 TABLET, EXTENDED RELEASE ORAL DAILY
Refills: 0 | Status: ACTIVE | COMMUNITY

## 2022-11-16 ENCOUNTER — APPOINTMENT (OUTPATIENT)
Dept: ORTHOPEDIC SURGERY | Facility: CLINIC | Age: 82
End: 2022-11-16

## 2022-11-16 VITALS — WEIGHT: 160 LBS | HEIGHT: 67 IN | BODY MASS INDEX: 25.11 KG/M2

## 2022-11-16 DIAGNOSIS — M17.12 UNILATERAL PRIMARY OSTEOARTHRITIS, LEFT KNEE: ICD-10-CM

## 2022-11-16 PROCEDURE — 99213 OFFICE O/P EST LOW 20 MIN: CPT

## 2022-11-16 NOTE — IMAGING
[de-identified] : Examination left lower extremity feels normal neurovascular exam.  Examination of the left knee reveals full range of motion of 0 to 125 degrees with normal tracking.  There is no crepitation there is no instability.  There is a well-healed midline scar with no effusion.  Screen exam of his left hip is normal with full painless range of motion.

## 2022-11-16 NOTE — ASSESSMENT
[FreeTextEntry1] : Patient comes in today follow-up on his left knee.  He is now 6 and half months out from his left total knee replacement.  Overall he is doing beautifully.  He walks and does stairs without any difficulty.  His only complaint is that he gets a little stiffness at night when he turns in bed.  He denies any swelling.  He denies any crepitation.

## 2022-11-16 NOTE — HISTORY OF PRESENT ILLNESS
[4] : 4 [3] : 3 [Dull/Aching] : dull/aching [] : Post Surgical Visit: no [FreeTextEntry1] : Lt. Knee [FreeTextEntry3] : N/A chronic [FreeTextEntry5] : 83 y/o M presents for f/u eval of Lt. knee [de-identified] : 08/17/2022 [de-identified] : Dr. Jones [de-identified] : 04/29/2022

## 2022-11-16 NOTE — DISCUSSION/SUMMARY
[de-identified] : Plan at this point is activities as tolerated.  I will see him back in the office as necessary.

## 2023-01-19 ENCOUNTER — OFFICE (OUTPATIENT)
Dept: URBAN - METROPOLITAN AREA CLINIC 102 | Facility: CLINIC | Age: 83
Setting detail: OPHTHALMOLOGY
End: 2023-01-19
Payer: MEDICARE

## 2023-01-19 DIAGNOSIS — H43.813: ICD-10-CM

## 2023-01-19 DIAGNOSIS — H40.1131: ICD-10-CM

## 2023-01-19 DIAGNOSIS — Z96.1: ICD-10-CM

## 2023-01-19 DIAGNOSIS — H35.3131: ICD-10-CM

## 2023-01-19 DIAGNOSIS — E11.9: ICD-10-CM

## 2023-01-19 PROCEDURE — 92014 COMPRE OPH EXAM EST PT 1/>: CPT | Performed by: OPHTHALMOLOGY

## 2023-01-19 PROCEDURE — 92134 CPTRZ OPH DX IMG PST SGM RTA: CPT | Performed by: OPHTHALMOLOGY

## 2023-01-19 ASSESSMENT — KERATOMETRY
OS_K2POWER_DIOPTERS: 42.25
OD_K2POWER_DIOPTERS: 42.75
OS_K1POWER_DIOPTERS: 41.25
OD_K1POWER_DIOPTERS: 41.75
OS_AXISANGLE_DEGREES: 023
OD_AXISANGLE_DEGREES: 089

## 2023-01-19 ASSESSMENT — VISUAL ACUITY
OD_BCVA: 20/25+
OS_BCVA: 20/25-

## 2023-01-19 ASSESSMENT — CONFRONTATIONAL VISUAL FIELD TEST (CVF)
OS_FINDINGS: FULL
OD_FINDINGS: FULL

## 2023-01-19 ASSESSMENT — REFRACTION_AUTOREFRACTION
OS_SPHERE: +1.00
OD_CYLINDER: -0.25
OD_SPHERE: PLANO
OS_CYLINDER: -1.25
OS_AXIS: 110
OD_AXIS: 115

## 2023-01-19 ASSESSMENT — PACHYMETRY
OD_CT_CORRECTION: -1
OS_CT_UM: 553
OS_CT_CORRECTION: -1
OD_CT_UM: 556

## 2023-01-19 ASSESSMENT — TONOMETRY
OS_IOP_MMHG: 13
OS_IOP_MMHG: 15
OD_IOP_MMHG: 16

## 2023-01-19 ASSESSMENT — AXIALLENGTH_DERIVED: OS_AL: 24.0993

## 2023-01-19 ASSESSMENT — SPHEQUIV_DERIVED: OS_SPHEQUIV: 0.375

## 2023-05-26 ENCOUNTER — OFFICE (OUTPATIENT)
Dept: URBAN - METROPOLITAN AREA CLINIC 102 | Facility: CLINIC | Age: 83
Setting detail: OPHTHALMOLOGY
End: 2023-05-26
Payer: MEDICARE

## 2023-05-26 DIAGNOSIS — H43.813: ICD-10-CM

## 2023-05-26 DIAGNOSIS — Z96.1: ICD-10-CM

## 2023-05-26 DIAGNOSIS — H40.1131: ICD-10-CM

## 2023-05-26 DIAGNOSIS — E11.9: ICD-10-CM

## 2023-05-26 DIAGNOSIS — H35.3131: ICD-10-CM

## 2023-05-26 PROCEDURE — 99213 OFFICE O/P EST LOW 20 MIN: CPT | Performed by: OPHTHALMOLOGY

## 2023-05-26 PROCEDURE — 92134 CPTRZ OPH DX IMG PST SGM RTA: CPT | Performed by: OPHTHALMOLOGY

## 2023-05-26 ASSESSMENT — TONOMETRY
OS_IOP_MMHG: 16
OD_IOP_MMHG: 20
OD_IOP_MMHG: 15
OS_IOP_MMHG: 11

## 2023-05-26 ASSESSMENT — VISUAL ACUITY
OS_BCVA: 20/30
OD_BCVA: 20/25

## 2023-05-26 ASSESSMENT — KERATOMETRY
OS_K2POWER_DIOPTERS: 42.25
OD_AXISANGLE_DEGREES: 007
OD_K1POWER_DIOPTERS: 41.50
METHOD_AUTO_MANUAL: AUTO
OS_AXISANGLE_DEGREES: 036
OD_K2POWER_DIOPTERS: 42.75
OS_K1POWER_DIOPTERS: 41.50

## 2023-05-26 ASSESSMENT — REFRACTION_AUTOREFRACTION
OD_CYLINDER: -1.00
OS_CYLINDER: -1.00
OS_AXIS: 114
OD_AXIS: 101
OS_SPHERE: +0.75
OD_SPHERE: +0.50

## 2023-05-26 ASSESSMENT — SPHEQUIV_DERIVED
OS_SPHEQUIV: 0.25
OD_SPHEQUIV: 0

## 2023-05-26 ASSESSMENT — PACHYMETRY
OS_CT_UM: 553
OD_CT_UM: 556
OS_CT_CORRECTION: -1
OD_CT_CORRECTION: -1

## 2023-05-26 ASSESSMENT — AXIALLENGTH_DERIVED
OS_AL: 24.1022
OD_AL: 24.108

## 2023-05-26 ASSESSMENT — CONFRONTATIONAL VISUAL FIELD TEST (CVF)
OS_FINDINGS: FULL
OD_FINDINGS: FULL

## 2023-10-05 ENCOUNTER — OFFICE (OUTPATIENT)
Dept: URBAN - METROPOLITAN AREA CLINIC 102 | Facility: CLINIC | Age: 83
Setting detail: OPHTHALMOLOGY
End: 2023-10-05
Payer: MEDICARE

## 2023-10-05 DIAGNOSIS — H35.3131: ICD-10-CM

## 2023-10-05 DIAGNOSIS — H43.813: ICD-10-CM

## 2023-10-05 DIAGNOSIS — E11.9: ICD-10-CM

## 2023-10-05 DIAGNOSIS — H40.1131: ICD-10-CM

## 2023-10-05 DIAGNOSIS — Z96.1: ICD-10-CM

## 2023-10-05 PROCEDURE — 92083 EXTENDED VISUAL FIELD XM: CPT | Performed by: OPHTHALMOLOGY

## 2023-10-05 PROCEDURE — 92014 COMPRE OPH EXAM EST PT 1/>: CPT | Performed by: OPHTHALMOLOGY

## 2023-10-05 PROCEDURE — 92133 CPTRZD OPH DX IMG PST SGM ON: CPT | Performed by: OPHTHALMOLOGY

## 2023-10-05 ASSESSMENT — CONFRONTATIONAL VISUAL FIELD TEST (CVF)
OD_FINDINGS: FULL
OS_FINDINGS: FULL

## 2023-10-05 ASSESSMENT — REFRACTION_AUTOREFRACTION
OD_AXIS: 093
OD_SPHERE: PLANO
OS_AXIS: 108
OS_SPHERE: +0.50
OD_CYLINDER: -0.75
OS_CYLINDER: -1.00

## 2023-10-05 ASSESSMENT — KERATOMETRY
OS_AXISANGLE_DEGREES: 039
OD_K1POWER_DIOPTERS: 42.25
OS_K2POWER_DIOPTERS: 42.25
OD_AXISANGLE_DEGREES: 082
METHOD_AUTO_MANUAL: AUTO
OS_K1POWER_DIOPTERS: 41.50
OD_K2POWER_DIOPTERS: 42.75

## 2023-10-05 ASSESSMENT — PACHYMETRY
OD_CT_UM: 556
OS_CT_CORRECTION: -1
OD_CT_CORRECTION: -1
OS_CT_UM: 553

## 2023-10-05 ASSESSMENT — SPHEQUIV_DERIVED: OS_SPHEQUIV: 0

## 2023-10-05 ASSESSMENT — VISUAL ACUITY
OD_BCVA: 20/40
OS_BCVA: 20/40

## 2023-10-05 ASSESSMENT — TONOMETRY
OS_IOP_MMHG: 15
OD_IOP_MMHG: 16

## 2023-10-05 ASSESSMENT — AXIALLENGTH_DERIVED: OS_AL: 24.2043

## 2023-11-09 ENCOUNTER — INPATIENT (INPATIENT)
Facility: HOSPITAL | Age: 83
LOS: 1 days | Discharge: HOME | DRG: 72 | End: 2023-11-11
Attending: HOSPITALIST | Admitting: INTERNAL MEDICINE
Payer: MEDICARE

## 2023-11-09 VITALS
WEIGHT: 160.06 LBS | DIASTOLIC BLOOD PRESSURE: 87 MMHG | HEIGHT: 66 IN | HEART RATE: 66 BPM | RESPIRATION RATE: 19 BRPM | OXYGEN SATURATION: 99 % | TEMPERATURE: 100 F | SYSTOLIC BLOOD PRESSURE: 144 MMHG

## 2023-11-09 DIAGNOSIS — Z98.890 OTHER SPECIFIED POSTPROCEDURAL STATES: Chronic | ICD-10-CM

## 2023-11-09 DIAGNOSIS — R41.82 ALTERED MENTAL STATUS, UNSPECIFIED: ICD-10-CM

## 2023-11-09 LAB
ALBUMIN SERPL ELPH-MCNC: 3.8 G/DL — SIGNIFICANT CHANGE UP (ref 3.3–5)
ALBUMIN SERPL ELPH-MCNC: 3.8 G/DL — SIGNIFICANT CHANGE UP (ref 3.3–5)
ALP SERPL-CCNC: 54 U/L — SIGNIFICANT CHANGE UP (ref 40–120)
ALP SERPL-CCNC: 54 U/L — SIGNIFICANT CHANGE UP (ref 40–120)
ALT FLD-CCNC: 20 U/L — SIGNIFICANT CHANGE UP (ref 12–78)
ALT FLD-CCNC: 20 U/L — SIGNIFICANT CHANGE UP (ref 12–78)
ANION GAP SERPL CALC-SCNC: 8 MMOL/L — SIGNIFICANT CHANGE UP (ref 5–17)
ANION GAP SERPL CALC-SCNC: 8 MMOL/L — SIGNIFICANT CHANGE UP (ref 5–17)
APPEARANCE UR: CLEAR — SIGNIFICANT CHANGE UP
APPEARANCE UR: CLEAR — SIGNIFICANT CHANGE UP
APTT BLD: 30.4 SEC — SIGNIFICANT CHANGE UP (ref 24.5–35.6)
APTT BLD: 30.4 SEC — SIGNIFICANT CHANGE UP (ref 24.5–35.6)
AST SERPL-CCNC: 20 U/L — SIGNIFICANT CHANGE UP (ref 15–37)
AST SERPL-CCNC: 20 U/L — SIGNIFICANT CHANGE UP (ref 15–37)
BASOPHILS # BLD AUTO: 0.02 K/UL — SIGNIFICANT CHANGE UP (ref 0–0.2)
BASOPHILS # BLD AUTO: 0.02 K/UL — SIGNIFICANT CHANGE UP (ref 0–0.2)
BASOPHILS NFR BLD AUTO: 0.2 % — SIGNIFICANT CHANGE UP (ref 0–2)
BASOPHILS NFR BLD AUTO: 0.2 % — SIGNIFICANT CHANGE UP (ref 0–2)
BILIRUB SERPL-MCNC: 0.9 MG/DL — SIGNIFICANT CHANGE UP (ref 0.2–1.2)
BILIRUB SERPL-MCNC: 0.9 MG/DL — SIGNIFICANT CHANGE UP (ref 0.2–1.2)
BILIRUB UR-MCNC: NEGATIVE — SIGNIFICANT CHANGE UP
BILIRUB UR-MCNC: NEGATIVE — SIGNIFICANT CHANGE UP
BUN SERPL-MCNC: 26 MG/DL — HIGH (ref 7–23)
BUN SERPL-MCNC: 26 MG/DL — HIGH (ref 7–23)
CALCIUM SERPL-MCNC: 9.1 MG/DL — SIGNIFICANT CHANGE UP (ref 8.5–10.1)
CALCIUM SERPL-MCNC: 9.1 MG/DL — SIGNIFICANT CHANGE UP (ref 8.5–10.1)
CHLORIDE SERPL-SCNC: 100 MMOL/L — SIGNIFICANT CHANGE UP (ref 96–108)
CHLORIDE SERPL-SCNC: 100 MMOL/L — SIGNIFICANT CHANGE UP (ref 96–108)
CO2 SERPL-SCNC: 26 MMOL/L — SIGNIFICANT CHANGE UP (ref 22–31)
CO2 SERPL-SCNC: 26 MMOL/L — SIGNIFICANT CHANGE UP (ref 22–31)
COLOR SPEC: YELLOW — SIGNIFICANT CHANGE UP
COLOR SPEC: YELLOW — SIGNIFICANT CHANGE UP
CREAT SERPL-MCNC: 1.13 MG/DL — SIGNIFICANT CHANGE UP (ref 0.5–1.3)
CREAT SERPL-MCNC: 1.13 MG/DL — SIGNIFICANT CHANGE UP (ref 0.5–1.3)
DIFF PNL FLD: NEGATIVE — SIGNIFICANT CHANGE UP
DIFF PNL FLD: NEGATIVE — SIGNIFICANT CHANGE UP
EGFR: 64 ML/MIN/1.73M2 — SIGNIFICANT CHANGE UP
EGFR: 64 ML/MIN/1.73M2 — SIGNIFICANT CHANGE UP
EOSINOPHIL # BLD AUTO: 0 K/UL — SIGNIFICANT CHANGE UP (ref 0–0.5)
EOSINOPHIL # BLD AUTO: 0 K/UL — SIGNIFICANT CHANGE UP (ref 0–0.5)
EOSINOPHIL NFR BLD AUTO: 0 % — SIGNIFICANT CHANGE UP (ref 0–6)
EOSINOPHIL NFR BLD AUTO: 0 % — SIGNIFICANT CHANGE UP (ref 0–6)
GLUCOSE SERPL-MCNC: 158 MG/DL — HIGH (ref 70–99)
GLUCOSE SERPL-MCNC: 158 MG/DL — HIGH (ref 70–99)
GLUCOSE UR QL: NEGATIVE MG/DL — SIGNIFICANT CHANGE UP
GLUCOSE UR QL: NEGATIVE MG/DL — SIGNIFICANT CHANGE UP
HCT VFR BLD CALC: 38.9 % — LOW (ref 39–50)
HCT VFR BLD CALC: 38.9 % — LOW (ref 39–50)
HGB BLD-MCNC: 13.4 G/DL — SIGNIFICANT CHANGE UP (ref 13–17)
HGB BLD-MCNC: 13.4 G/DL — SIGNIFICANT CHANGE UP (ref 13–17)
IMM GRANULOCYTES NFR BLD AUTO: 0.3 % — SIGNIFICANT CHANGE UP (ref 0–0.9)
IMM GRANULOCYTES NFR BLD AUTO: 0.3 % — SIGNIFICANT CHANGE UP (ref 0–0.9)
INR BLD: 1.15 RATIO — SIGNIFICANT CHANGE UP (ref 0.85–1.18)
INR BLD: 1.15 RATIO — SIGNIFICANT CHANGE UP (ref 0.85–1.18)
KETONES UR-MCNC: 15 MG/DL
KETONES UR-MCNC: 15 MG/DL
LACTATE SERPL-SCNC: 1.5 MMOL/L — SIGNIFICANT CHANGE UP (ref 0.7–2)
LACTATE SERPL-SCNC: 1.5 MMOL/L — SIGNIFICANT CHANGE UP (ref 0.7–2)
LEUKOCYTE ESTERASE UR-ACNC: NEGATIVE — SIGNIFICANT CHANGE UP
LEUKOCYTE ESTERASE UR-ACNC: NEGATIVE — SIGNIFICANT CHANGE UP
LYMPHOCYTES # BLD AUTO: 1.51 K/UL — SIGNIFICANT CHANGE UP (ref 1–3.3)
LYMPHOCYTES # BLD AUTO: 1.51 K/UL — SIGNIFICANT CHANGE UP (ref 1–3.3)
LYMPHOCYTES # BLD AUTO: 14.2 % — SIGNIFICANT CHANGE UP (ref 13–44)
LYMPHOCYTES # BLD AUTO: 14.2 % — SIGNIFICANT CHANGE UP (ref 13–44)
MCHC RBC-ENTMCNC: 31.8 PG — SIGNIFICANT CHANGE UP (ref 27–34)
MCHC RBC-ENTMCNC: 31.8 PG — SIGNIFICANT CHANGE UP (ref 27–34)
MCHC RBC-ENTMCNC: 34.4 GM/DL — SIGNIFICANT CHANGE UP (ref 32–36)
MCHC RBC-ENTMCNC: 34.4 GM/DL — SIGNIFICANT CHANGE UP (ref 32–36)
MCV RBC AUTO: 92.4 FL — SIGNIFICANT CHANGE UP (ref 80–100)
MCV RBC AUTO: 92.4 FL — SIGNIFICANT CHANGE UP (ref 80–100)
MONOCYTES # BLD AUTO: 0.85 K/UL — SIGNIFICANT CHANGE UP (ref 0–0.9)
MONOCYTES # BLD AUTO: 0.85 K/UL — SIGNIFICANT CHANGE UP (ref 0–0.9)
MONOCYTES NFR BLD AUTO: 8 % — SIGNIFICANT CHANGE UP (ref 2–14)
MONOCYTES NFR BLD AUTO: 8 % — SIGNIFICANT CHANGE UP (ref 2–14)
NEUTROPHILS # BLD AUTO: 8.2 K/UL — HIGH (ref 1.8–7.4)
NEUTROPHILS # BLD AUTO: 8.2 K/UL — HIGH (ref 1.8–7.4)
NEUTROPHILS NFR BLD AUTO: 77.3 % — HIGH (ref 43–77)
NEUTROPHILS NFR BLD AUTO: 77.3 % — HIGH (ref 43–77)
NITRITE UR-MCNC: NEGATIVE — SIGNIFICANT CHANGE UP
NITRITE UR-MCNC: NEGATIVE — SIGNIFICANT CHANGE UP
PH UR: 6 — SIGNIFICANT CHANGE UP (ref 5–8)
PH UR: 6 — SIGNIFICANT CHANGE UP (ref 5–8)
PLATELET # BLD AUTO: 187 K/UL — SIGNIFICANT CHANGE UP (ref 150–400)
PLATELET # BLD AUTO: 187 K/UL — SIGNIFICANT CHANGE UP (ref 150–400)
POTASSIUM SERPL-MCNC: 4.3 MMOL/L — SIGNIFICANT CHANGE UP (ref 3.5–5.3)
POTASSIUM SERPL-MCNC: 4.3 MMOL/L — SIGNIFICANT CHANGE UP (ref 3.5–5.3)
POTASSIUM SERPL-SCNC: 4.3 MMOL/L — SIGNIFICANT CHANGE UP (ref 3.5–5.3)
POTASSIUM SERPL-SCNC: 4.3 MMOL/L — SIGNIFICANT CHANGE UP (ref 3.5–5.3)
PROT SERPL-MCNC: 7.3 GM/DL — SIGNIFICANT CHANGE UP (ref 6–8.3)
PROT SERPL-MCNC: 7.3 GM/DL — SIGNIFICANT CHANGE UP (ref 6–8.3)
PROT UR-MCNC: NEGATIVE MG/DL — SIGNIFICANT CHANGE UP
PROT UR-MCNC: NEGATIVE MG/DL — SIGNIFICANT CHANGE UP
PROTHROM AB SERPL-ACNC: 12.9 SEC — SIGNIFICANT CHANGE UP (ref 9.5–13)
PROTHROM AB SERPL-ACNC: 12.9 SEC — SIGNIFICANT CHANGE UP (ref 9.5–13)
RBC # BLD: 4.21 M/UL — SIGNIFICANT CHANGE UP (ref 4.2–5.8)
RBC # BLD: 4.21 M/UL — SIGNIFICANT CHANGE UP (ref 4.2–5.8)
RBC # FLD: 13 % — SIGNIFICANT CHANGE UP (ref 10.3–14.5)
RBC # FLD: 13 % — SIGNIFICANT CHANGE UP (ref 10.3–14.5)
SODIUM SERPL-SCNC: 134 MMOL/L — LOW (ref 135–145)
SODIUM SERPL-SCNC: 134 MMOL/L — LOW (ref 135–145)
SP GR SPEC: 1.02 — SIGNIFICANT CHANGE UP (ref 1–1.03)
SP GR SPEC: 1.02 — SIGNIFICANT CHANGE UP (ref 1–1.03)
UROBILINOGEN FLD QL: 1 MG/DL — SIGNIFICANT CHANGE UP (ref 0.2–1)
UROBILINOGEN FLD QL: 1 MG/DL — SIGNIFICANT CHANGE UP (ref 0.2–1)
WBC # BLD: 10.61 K/UL — HIGH (ref 3.8–10.5)
WBC # BLD: 10.61 K/UL — HIGH (ref 3.8–10.5)
WBC # FLD AUTO: 10.61 K/UL — HIGH (ref 3.8–10.5)
WBC # FLD AUTO: 10.61 K/UL — HIGH (ref 3.8–10.5)

## 2023-11-09 PROCEDURE — 70450 CT HEAD/BRAIN W/O DYE: CPT | Mod: 26,MA

## 2023-11-09 PROCEDURE — 99285 EMERGENCY DEPT VISIT HI MDM: CPT

## 2023-11-09 PROCEDURE — 80061 LIPID PANEL: CPT

## 2023-11-09 PROCEDURE — 36415 COLL VENOUS BLD VENIPUNCTURE: CPT

## 2023-11-09 PROCEDURE — 86780 TREPONEMA PALLIDUM: CPT

## 2023-11-09 PROCEDURE — 84484 ASSAY OF TROPONIN QUANT: CPT

## 2023-11-09 PROCEDURE — 87484 EHRLICHA CHAFFEENSIS AMP PRB: CPT

## 2023-11-09 PROCEDURE — 97530 THERAPEUTIC ACTIVITIES: CPT | Mod: GP

## 2023-11-09 PROCEDURE — 97116 GAIT TRAINING THERAPY: CPT | Mod: GP

## 2023-11-09 PROCEDURE — 80048 BASIC METABOLIC PNL TOTAL CA: CPT

## 2023-11-09 PROCEDURE — 84443 ASSAY THYROID STIM HORMONE: CPT

## 2023-11-09 PROCEDURE — 87468 ANAPLSMA PHGCYTOPHLM AMP PRB: CPT

## 2023-11-09 PROCEDURE — 93010 ELECTROCARDIOGRAM REPORT: CPT

## 2023-11-09 PROCEDURE — 85027 COMPLETE CBC AUTOMATED: CPT

## 2023-11-09 PROCEDURE — 87798 DETECT AGENT NOS DNA AMP: CPT

## 2023-11-09 PROCEDURE — 82728 ASSAY OF FERRITIN: CPT

## 2023-11-09 PROCEDURE — 85652 RBC SED RATE AUTOMATED: CPT

## 2023-11-09 PROCEDURE — 86757 RICKETTSIA ANTIBODY: CPT

## 2023-11-09 PROCEDURE — 86618 LYME DISEASE ANTIBODY: CPT

## 2023-11-09 PROCEDURE — 86788 WEST NILE VIRUS AB IGM: CPT

## 2023-11-09 PROCEDURE — 83615 LACTATE (LD) (LDH) ENZYME: CPT

## 2023-11-09 PROCEDURE — 86140 C-REACTIVE PROTEIN: CPT

## 2023-11-09 PROCEDURE — 95816 EEG AWAKE AND DROWSY: CPT

## 2023-11-09 PROCEDURE — 86789 WEST NILE VIRUS ANTIBODY: CPT

## 2023-11-09 PROCEDURE — 82962 GLUCOSE BLOOD TEST: CPT

## 2023-11-09 PROCEDURE — 83036 HEMOGLOBIN GLYCOSYLATED A1C: CPT

## 2023-11-09 PROCEDURE — 82607 VITAMIN B-12: CPT

## 2023-11-09 PROCEDURE — 71045 X-RAY EXAM CHEST 1 VIEW: CPT | Mod: 26

## 2023-11-09 RX ORDER — ASPIRIN/CALCIUM CARB/MAGNESIUM 324 MG
325 TABLET ORAL ONCE
Refills: 0 | Status: COMPLETED | OUTPATIENT
Start: 2023-11-09 | End: 2023-11-09

## 2023-11-09 RX ORDER — SODIUM CHLORIDE 9 MG/ML
1000 INJECTION INTRAMUSCULAR; INTRAVENOUS; SUBCUTANEOUS ONCE
Refills: 0 | Status: COMPLETED | OUTPATIENT
Start: 2023-11-09 | End: 2023-11-09

## 2023-11-09 RX ADMIN — Medication 30 MILLILITER(S): at 23:22

## 2023-11-09 RX ADMIN — Medication 325 MILLIGRAM(S): at 23:50

## 2023-11-09 RX ADMIN — SODIUM CHLORIDE 1000 MILLILITER(S): 9 INJECTION INTRAMUSCULAR; INTRAVENOUS; SUBCUTANEOUS at 21:17

## 2023-11-09 NOTE — ED PROVIDER NOTE - OBJECTIVE STATEMENT
84 y/o male with PMHx of PAF not on AC, BPH, T2DM, HTN, HLD, [place], and urinary incontinence BIBEMS to the ED c/o AMS. Daughter at bedside reports symptoms started today morning pt called her up behaving confused not sure if he took his medications. Since then pt has been acting more fatigued, confused, disoriented with unsteady gait requiring assistance to walk when at his baseline he ambulates independently. Went to routine PCP appointment scheduled today was sent to the ED for further evaluation rule out CVA/TIA/CNS etiology.   PCP: Dr. Bret Jones

## 2023-11-09 NOTE — ED ADULT NURSE NOTE - OBJECTIVE STATEMENT
pt presents to ED c/o disorientation/AMS. pt family noticed he has been off x1 day PTA. pt baseline confusion, but has drastically increased over the past 24 hours. pt hx diabetes, HTN, HLD. pt also endorsing impaired gait. daughter states "its like his brain is not matching up with his feet". pt denies cp, sob, ha, dizziness, n/v/d. pt daughter at bedside. pt has no further complaints.

## 2023-11-09 NOTE — ED PROVIDER NOTE - NSICDXPASTMEDICALHX_GEN_ALL_CORE_FT
PAST MEDICAL HISTORY:  Anxiety     BPH (benign prostatic hyperplasia)     DNS (deviated nasal septum) history of. Corrected with surgery    HLD (hyperlipidemia)     [place] (hard of hearing) Bilateral hearing aids    HTN (hypertension)     Macular degeneration     Nocturia     Osteoarthritis left knee    Type II diabetes mellitus     Urinary incontinence

## 2023-11-09 NOTE — ED ADULT NURSE NOTE - NSFALLHARMRISKINTERV_ED_ALL_ED
Assistance OOB with selected safe patient handling equipment if applicable/Assistance with ambulation/Communicate risk of Fall with Harm to all staff, patient, and family/Monitor gait and stability/Provide visual cue: red socks, yellow wristband, yellow gown, etc/Reinforce activity limits and safety measures with patient and family/Bed in lowest position, wheels locked, appropriate side rails in place/Call bell, personal items and telephone in reach/Instruct patient to call for assistance before getting out of bed/chair/stretcher/Non-slip footwear applied when patient is off stretcher/[place] to call system/Physically safe environment - no spills, clutter or unnecessary equipment/Purposeful Proactive Rounding/Room/bathroom lighting operational, light cord in reach

## 2023-11-09 NOTE — ED PROVIDER NOTE - CLINICAL SUMMARY MEDICAL DECISION MAKING FREE TEXT BOX
82 y/o male with AMS that began in the last 1 day. No known exact time of onset therefore  no stroke code needed. Will obtain bloodwork rule out infectious versus electrolyte abnormality, CT brain as pt did hit his head today, and will admit for further workup.

## 2023-11-09 NOTE — ED PROVIDER NOTE - PHYSICAL EXAMINATION
Constitutional: Elderly male laying in bed  Eyes: PERRLA  Head: Normocephalic   Mouth: MMM  Cardiac: regular rate   Resp: Lungs CTAB  GI: Abd s/nt/nd, no rebound or guarding.  Neuro: confused, asking repetitive questions   Skin: No rashes

## 2023-11-09 NOTE — ED ADULT TRIAGE NOTE - CHIEF COMPLAINT QUOTE
Patient confused since sometime yesterday as per daughter.  Per daughter patient has had memory issues but is normally A&Ox4, in triage he is only A&Ox1.  Patient has been having some gait issues as well.

## 2023-11-10 LAB
A1C WITH ESTIMATED AVERAGE GLUCOSE RESULT: 7 % — HIGH (ref 4–5.6)
A1C WITH ESTIMATED AVERAGE GLUCOSE RESULT: 7 % — HIGH (ref 4–5.6)
ESTIMATED AVERAGE GLUCOSE: 154 MG/DL — HIGH (ref 68–114)
ESTIMATED AVERAGE GLUCOSE: 154 MG/DL — HIGH (ref 68–114)
GLUCOSE BLDC GLUCOMTR-MCNC: 133 MG/DL — HIGH (ref 70–99)
GLUCOSE BLDC GLUCOMTR-MCNC: 133 MG/DL — HIGH (ref 70–99)
GLUCOSE BLDC GLUCOMTR-MCNC: 185 MG/DL — HIGH (ref 70–99)
GLUCOSE BLDC GLUCOMTR-MCNC: 185 MG/DL — HIGH (ref 70–99)
GLUCOSE BLDC GLUCOMTR-MCNC: 201 MG/DL — HIGH (ref 70–99)
GLUCOSE BLDC GLUCOMTR-MCNC: 201 MG/DL — HIGH (ref 70–99)
GLUCOSE BLDC GLUCOMTR-MCNC: 203 MG/DL — HIGH (ref 70–99)
GLUCOSE BLDC GLUCOMTR-MCNC: 203 MG/DL — HIGH (ref 70–99)
GLUCOSE BLDC GLUCOMTR-MCNC: 98 MG/DL — SIGNIFICANT CHANGE UP (ref 70–99)
GLUCOSE BLDC GLUCOMTR-MCNC: 98 MG/DL — SIGNIFICANT CHANGE UP (ref 70–99)
RAPID RVP RESULT: SIGNIFICANT CHANGE UP
RAPID RVP RESULT: SIGNIFICANT CHANGE UP
SARS-COV-2 RNA SPEC QL NAA+PROBE: SIGNIFICANT CHANGE UP
SARS-COV-2 RNA SPEC QL NAA+PROBE: SIGNIFICANT CHANGE UP
TROPONIN I, HIGH SENSITIVITY RESULT: 29.39 NG/L — SIGNIFICANT CHANGE UP
TROPONIN I, HIGH SENSITIVITY RESULT: 29.39 NG/L — SIGNIFICANT CHANGE UP
TROPONIN I, HIGH SENSITIVITY RESULT: 29.7 NG/L — SIGNIFICANT CHANGE UP
TROPONIN I, HIGH SENSITIVITY RESULT: 29.7 NG/L — SIGNIFICANT CHANGE UP
TROPONIN I, HIGH SENSITIVITY RESULT: 32.52 NG/L — SIGNIFICANT CHANGE UP
TROPONIN I, HIGH SENSITIVITY RESULT: 32.52 NG/L — SIGNIFICANT CHANGE UP

## 2023-11-10 PROCEDURE — 99223 1ST HOSP IP/OBS HIGH 75: CPT

## 2023-11-10 RX ORDER — GLUCAGON INJECTION, SOLUTION 0.5 MG/.1ML
1 INJECTION, SOLUTION SUBCUTANEOUS ONCE
Refills: 0 | Status: DISCONTINUED | OUTPATIENT
Start: 2023-11-10 | End: 2023-11-11

## 2023-11-10 RX ORDER — DEXTROSE 50 % IN WATER 50 %
12.5 SYRINGE (ML) INTRAVENOUS ONCE
Refills: 0 | Status: DISCONTINUED | OUTPATIENT
Start: 2023-11-10 | End: 2023-11-11

## 2023-11-10 RX ORDER — HALOPERIDOL DECANOATE 100 MG/ML
1 INJECTION INTRAMUSCULAR ONCE
Refills: 0 | Status: COMPLETED | OUTPATIENT
Start: 2023-11-10 | End: 2023-11-10

## 2023-11-10 RX ORDER — VANCOMYCIN HCL 1 G
VIAL (EA) INTRAVENOUS
Refills: 0 | Status: DISCONTINUED | OUTPATIENT
Start: 2023-11-10 | End: 2023-11-11

## 2023-11-10 RX ORDER — PANTOPRAZOLE SODIUM 20 MG/1
40 TABLET, DELAYED RELEASE ORAL
Refills: 0 | Status: DISCONTINUED | OUTPATIENT
Start: 2023-11-10 | End: 2023-11-11

## 2023-11-10 RX ORDER — AMLODIPINE BESYLATE 2.5 MG/1
1 TABLET ORAL
Qty: 0 | Refills: 0 | DISCHARGE

## 2023-11-10 RX ORDER — METOPROLOL TARTRATE 50 MG
50 TABLET ORAL DAILY
Refills: 0 | Status: DISCONTINUED | OUTPATIENT
Start: 2023-11-10 | End: 2023-11-11

## 2023-11-10 RX ORDER — SODIUM CHLORIDE 9 MG/ML
1000 INJECTION, SOLUTION INTRAVENOUS
Refills: 0 | Status: DISCONTINUED | OUTPATIENT
Start: 2023-11-10 | End: 2023-11-11

## 2023-11-10 RX ORDER — METOPROLOL TARTRATE 50 MG
100 TABLET ORAL DAILY
Refills: 0 | Status: DISCONTINUED | OUTPATIENT
Start: 2023-11-10 | End: 2023-11-10

## 2023-11-10 RX ORDER — DEXTROSE 50 % IN WATER 50 %
25 SYRINGE (ML) INTRAVENOUS ONCE
Refills: 0 | Status: DISCONTINUED | OUTPATIENT
Start: 2023-11-10 | End: 2023-11-11

## 2023-11-10 RX ORDER — ENOXAPARIN SODIUM 100 MG/ML
40 INJECTION SUBCUTANEOUS EVERY 24 HOURS
Refills: 0 | Status: DISCONTINUED | OUTPATIENT
Start: 2023-11-10 | End: 2023-11-11

## 2023-11-10 RX ORDER — FINASTERIDE 5 MG/1
5 TABLET, FILM COATED ORAL DAILY
Refills: 0 | Status: DISCONTINUED | OUTPATIENT
Start: 2023-11-10 | End: 2023-11-11

## 2023-11-10 RX ORDER — CHOLECALCIFEROL (VITAMIN D3) 125 MCG
2000 CAPSULE ORAL DAILY
Refills: 0 | Status: DISCONTINUED | OUTPATIENT
Start: 2023-11-10 | End: 2023-11-11

## 2023-11-10 RX ORDER — ROSUVASTATIN CALCIUM 5 MG/1
10 TABLET ORAL AT BEDTIME
Refills: 0 | Status: DISCONTINUED | OUTPATIENT
Start: 2023-11-10 | End: 2023-11-11

## 2023-11-10 RX ORDER — ACETAMINOPHEN 500 MG
1000 TABLET ORAL ONCE
Refills: 0 | Status: COMPLETED | OUTPATIENT
Start: 2023-11-10 | End: 2023-11-10

## 2023-11-10 RX ORDER — HALOPERIDOL DECANOATE 100 MG/ML
1.5 INJECTION INTRAMUSCULAR ONCE
Refills: 0 | Status: COMPLETED | OUTPATIENT
Start: 2023-11-10 | End: 2023-11-10

## 2023-11-10 RX ORDER — ACETAMINOPHEN 500 MG
650 TABLET ORAL EVERY 6 HOURS
Refills: 0 | Status: DISCONTINUED | OUTPATIENT
Start: 2023-11-10 | End: 2023-11-11

## 2023-11-10 RX ORDER — CEFEPIME 1 G/1
1000 INJECTION, POWDER, FOR SOLUTION INTRAMUSCULAR; INTRAVENOUS EVERY 12 HOURS
Refills: 0 | Status: DISCONTINUED | OUTPATIENT
Start: 2023-11-11 | End: 2023-11-11

## 2023-11-10 RX ORDER — LATANOPROST 0.05 MG/ML
1 SOLUTION/ DROPS OPHTHALMIC; TOPICAL AT BEDTIME
Refills: 0 | Status: DISCONTINUED | OUTPATIENT
Start: 2023-11-10 | End: 2023-11-11

## 2023-11-10 RX ORDER — ASPIRIN/CALCIUM CARB/MAGNESIUM 324 MG
81 TABLET ORAL DAILY
Refills: 0 | Status: DISCONTINUED | OUTPATIENT
Start: 2023-11-10 | End: 2023-11-11

## 2023-11-10 RX ORDER — ASPIRIN/CALCIUM CARB/MAGNESIUM 324 MG
1 TABLET ORAL
Qty: 0 | Refills: 0 | DISCHARGE

## 2023-11-10 RX ORDER — LOSARTAN POTASSIUM 100 MG/1
50 TABLET, FILM COATED ORAL DAILY
Refills: 0 | Status: DISCONTINUED | OUTPATIENT
Start: 2023-11-10 | End: 2023-11-11

## 2023-11-10 RX ORDER — DEXTROSE 50 % IN WATER 50 %
15 SYRINGE (ML) INTRAVENOUS ONCE
Refills: 0 | Status: DISCONTINUED | OUTPATIENT
Start: 2023-11-10 | End: 2023-11-11

## 2023-11-10 RX ORDER — INSULIN LISPRO 100/ML
VIAL (ML) SUBCUTANEOUS
Refills: 0 | Status: DISCONTINUED | OUTPATIENT
Start: 2023-11-10 | End: 2023-11-11

## 2023-11-10 RX ORDER — ESCITALOPRAM OXALATE 10 MG/1
10 TABLET, FILM COATED ORAL DAILY
Refills: 0 | Status: DISCONTINUED | OUTPATIENT
Start: 2023-11-10 | End: 2023-11-11

## 2023-11-10 RX ORDER — CEFEPIME 1 G/1
INJECTION, POWDER, FOR SOLUTION INTRAMUSCULAR; INTRAVENOUS
Refills: 0 | Status: DISCONTINUED | OUTPATIENT
Start: 2023-11-10 | End: 2023-11-10

## 2023-11-10 RX ORDER — VANCOMYCIN HCL 1 G
1000 VIAL (EA) INTRAVENOUS EVERY 12 HOURS
Refills: 0 | Status: DISCONTINUED | OUTPATIENT
Start: 2023-11-11 | End: 2023-11-11

## 2023-11-10 RX ORDER — CEFEPIME 1 G/1
INJECTION, POWDER, FOR SOLUTION INTRAMUSCULAR; INTRAVENOUS
Refills: 0 | Status: DISCONTINUED | OUTPATIENT
Start: 2023-11-10 | End: 2023-11-11

## 2023-11-10 RX ORDER — VANCOMYCIN HCL 1 G
1000 VIAL (EA) INTRAVENOUS ONCE
Refills: 0 | Status: COMPLETED | OUTPATIENT
Start: 2023-11-10 | End: 2023-11-10

## 2023-11-10 RX ORDER — CEFEPIME 1 G/1
1000 INJECTION, POWDER, FOR SOLUTION INTRAMUSCULAR; INTRAVENOUS ONCE
Refills: 0 | Status: COMPLETED | OUTPATIENT
Start: 2023-11-10 | End: 2023-11-10

## 2023-11-10 RX ORDER — INSULIN LISPRO 100/ML
VIAL (ML) SUBCUTANEOUS AT BEDTIME
Refills: 0 | Status: DISCONTINUED | OUTPATIENT
Start: 2023-11-10 | End: 2023-11-11

## 2023-11-10 RX ADMIN — ESCITALOPRAM OXALATE 10 MILLIGRAM(S): 10 TABLET, FILM COATED ORAL at 09:34

## 2023-11-10 RX ADMIN — LOSARTAN POTASSIUM 50 MILLIGRAM(S): 100 TABLET, FILM COATED ORAL at 09:34

## 2023-11-10 RX ADMIN — Medication 2: at 17:06

## 2023-11-10 RX ADMIN — ENOXAPARIN SODIUM 40 MILLIGRAM(S): 100 INJECTION SUBCUTANEOUS at 17:07

## 2023-11-10 RX ADMIN — Medication 2000 UNIT(S): at 09:34

## 2023-11-10 RX ADMIN — Medication 81 MILLIGRAM(S): at 09:33

## 2023-11-10 RX ADMIN — HALOPERIDOL DECANOATE 1.5 MILLIGRAM(S): 100 INJECTION INTRAMUSCULAR at 19:56

## 2023-11-10 RX ADMIN — Medication 400 MILLIGRAM(S): at 22:47

## 2023-11-10 RX ADMIN — Medication 1000 MILLIGRAM(S): at 23:07

## 2023-11-10 RX ADMIN — Medication 1 MILLIGRAM(S): at 18:23

## 2023-11-10 RX ADMIN — Medication 50 MILLIGRAM(S): at 09:34

## 2023-11-10 RX ADMIN — Medication 30 MILLILITER(S): at 03:30

## 2023-11-10 RX ADMIN — Medication 1: at 11:55

## 2023-11-10 RX ADMIN — Medication 250 MILLIGRAM(S): at 23:38

## 2023-11-10 RX ADMIN — FINASTERIDE 5 MILLIGRAM(S): 5 TABLET, FILM COATED ORAL at 09:33

## 2023-11-10 RX ADMIN — ROSUVASTATIN CALCIUM 10 MILLIGRAM(S): 5 TABLET ORAL at 21:20

## 2023-11-10 RX ADMIN — HALOPERIDOL DECANOATE 1 MILLIGRAM(S): 100 INJECTION INTRAMUSCULAR at 19:32

## 2023-11-10 RX ADMIN — CEFEPIME 1000 MILLIGRAM(S): 1 INJECTION, POWDER, FOR SOLUTION INTRAMUSCULAR; INTRAVENOUS at 22:50

## 2023-11-10 NOTE — PHARMACOTHERAPY INTERVENTION NOTE - COMMENTS
Medication reconciliation completed.  Patient's granddaughter Joselin provided list of current medication; confirmed with Dr. cJ Medx.  Joselin confirms that pt did not take meds on schedule yesterday, that he took the morning doses of his meds at ~5pm yesterday.

## 2023-11-10 NOTE — H&P ADULT - ASSESSMENT
Acute Encephalopathy at home with confusion, disorientation, unsteady gait, headache, fatigue.  Could be due to TIA/CVA vs. cardiac arrthymia and cardiac pauses which were noted on tele monitoring in the ED.  Patient with risk factors for CVA.  NIHSS in the ED was 0.  CT Head negative.  Does not appear to be related to infection or changes in medications.      Paroxysmal Atrial Fibrillation, now in NSR, not on A/C    Mild Leukocytosis without evidence of active clinical infection    Hypertension    Hyperlipidemia    Type 2 Diabetes Mellitus      PLAN:  -  admit to tele, inpatient, hospitalist service  -  decrease dose of Lopressor to 50mg daily  -  Cardio consult - Dr. Martinez  -  neurochecks q4 hours  -  fall precautions  -  start ASA  -  d/c Metformin  -  ADA diet, check sugars qAC/HS, Admelog SSI  -  continue statin  -  check fasting lipid panel, check HgA1c  -  repeat labs in am  -  check MRI of the brain, r/o CVA  -  DVT prophylaxis - venodynes and Lovenox  -  PT evaluation and treatment    d/w patient and with granddaughter at the bedside     Acute Encephalopathy at home with confusion, disorientation, unsteady gait, headache, fatigue.  Could be due to TIA/CVA vs. cardiac arrthymia and cardiac pauses which were noted on tele monitoring in the ED.  Patient with risk factors for CVA.  NIHSS in the ED was 0.  CT Head negative.  Does not appear to be related to infection or changes in medications.      Paroxysmal Atrial Fibrillation, now in NSR, not on A/C    Mild Leukocytosis without evidence of active clinical infection    Hypertension    Hyperlipidemia    Type 2 Diabetes Mellitus      PLAN:  -  admit to tele, inpatient, hospitalist service  -  decrease dose of Lopressor to 50mg daily  -  Cardio consult - Dr. Martinez  -  check troponins  -  neurochecks q4 hours  -  fall precautions  -  start ASA  -  d/c Metformin  -  ADA diet, check sugars qAC/HS, Admelog SSI  -  continue statin  -  check fasting lipid panel, check HgA1c  -  repeat labs in am  -  check MRI of the brain, r/o CVA  -  DVT prophylaxis - venodynes and Lovenox  -  PT evaluation and treatment    d/w patient and with granddaughter at the bedside

## 2023-11-10 NOTE — H&P ADULT - NSHPLABSRESULTS_GEN_ALL_CORE
13.4   10.61 )-----------( 187      ( 09 Nov 2023 21:15 )             38.9     11-09    134<L>  |  100  |  26<H>  ----------------------------<  158<H>  4.3   |  26  |  1.13    Ca    9.1      09 Nov 2023 21:15    TPro  7.3  /  Alb  3.8  /  TBili  0.9  /  DBili  x   /  AST  20  /  ALT  20  /  AlkPhos  54  11-09    LIVER FUNCTIONS - ( 09 Nov 2023 21:15 )  Alb: 3.8 g/dL / Pro: 7.3 gm/dL / ALK PHOS: 54 U/L / ALT: 20 U/L / AST: 20 U/L / GGT: x           PT/INR - ( 09 Nov 2023 21:15 )   PT: 12.9 sec;   INR: 1.15 ratio       PTT - ( 09 Nov 2023 21:15 )  PTT:30.4 sec  Urinalysis Basic - ( 09 Nov 2023 21:15 )    Color: x / Appearance: x / SG: x / pH: x  Gluc: 158 mg/dL / Ketone: x  / Bili: x / Urobili: x   Blood: x / Protein: x / Nitrite: x   Leuk Esterase: x / RBC: x / WBC x   Sq Epi: x / Non Sq Epi: x / Bacteria: x    Lactate, Blood: 1.5 mmol/L (11-09 @ 21:15)    Blood, Urine: Negative (11-09 @ 21:01)    EKG - NSR, normal axis, no acute ischemic changes, + PVCs, QTc 478    CT HEAD:    IMPRESSION:  No acute intracranial hemorrhage or mass effect.  Chronic changes, as described above.

## 2023-11-10 NOTE — ED ADULT NURSE REASSESSMENT NOTE - NS ED NURSE REASSESS COMMENT FT1
Assumed care of patient at 12:20. Patient AxOx4, in no acute distress. VS stable. MD Carrion aware of patient's HR fluctuations from high 30's to 50's. Patient asymptomatic. MRI safety screening completed and faxed to MRI. Comfort and safety measures maintained, side rails up. All needs addressed. Updated on plan of care, all questions and concerns addressed. Will continue to monitor.

## 2023-11-10 NOTE — H&P ADULT - NSHPPHYSICALEXAM_GEN_ALL_CORE
HEENT:  pupils equal and reactive, EOMI, no oropharyngeal lesions, erythema, exudates, oral thrush  NECK:   supple, no carotid bruits, no palpable lymph nodes, no thyromegaly  CV:  +S1, +S2, regular, no murmurs or rubs  RESP:   lungs clear to auscultation bilaterally, no wheezing, rales, rhonchi, good air entry bilaterally  GI:  abdomen soft, non-tender, non-distended, normal BS, no bruits, no abdominal masses, no palpable masses  EXT:  no clubbing, no cyanosis, no edema, no calf pain, swelling or erythema  NEURO:  AAOX3, CNs 2-12 intact, no focal neurological deficits, strength is equal and symmetric in both upper and lower extremities, gait was not tested  SKIN:  no ulcers, lesions or rashes

## 2023-11-10 NOTE — CONSULT NOTE ADULT - SUBJECTIVE AND OBJECTIVE BOX
Patient is a 83y old  Male who presents with a chief complaint of altered mental status (10 Nov 2023 08:50)      HPI:      83 M with Hx of HTN, DM, hyperlipidemia, presented to the ED last night with altered mental status.  At this time, history has been taken by review of old records and the ED notes as patient is unable to let me know why he was sent to the hospital.  According to the notes, he was experiencing 1 day of increasing fatigue, confusion, disorientation, headache and unsteady gait at home.  He was evaluated by his PCP who sent him to the ED to be evaluated and admitted.  In the ED, the onset of symptoms were not clear.  Code stroke was not initiated.  NIHSS in the ED = 0  There was not reported new medications started at home and no increase in the dose of his medications.  No reported fevers, chills, shakes, cough, shortness of breath, nausea, vomiting, dysuria, abdominal pain, chest pain, dizziness, diarrhea.      In the ED, he had a CT of the head which was negative.  Admitting blood work was essentially unremarkable.  EKG in the ED showed sinus rhythm, but in the ED on the tele monitor he had 2 cardiac pauses, one of 1.4 seconds and a second of 2 seconds.      PAST MEDICAL HISTORY:  HTN  Hyperlipidemia  Type 2 Diabetes Mellitus  Paroxysmal Atrial Fibrillation, not on A/C  Glaucoma  Macular Degeneration  Diabetic Neuropathy  BPH    PAST SURGICAL HISTORY:  s/p left knee replacement  s/p hernia repair  s/p nasal septal surgery      FAMILY HISTORY:   Hx of HTN (10 Nov 2023 08:50)      PAST MEDICAL & SURGICAL HISTORY:  HTN (hypertension)      HLD (hyperlipidemia)      Type II diabetes mellitus      Macular degeneration      Anxiety      Urinary incontinence      BPH (benign prostatic hyperplasia)      Nocturia      DNS (deviated nasal septum)  history of. Corrected with surgery      [place] (hard of hearing)  Bilateral hearing aids      Osteoarthritis  left knee      History of nasal septoplasty  due to deviated septum      S/P hernia repair      H/O laser photocoagulation of retina      History of prostate surgery  [facility] 4/ 2019      History of colonoscopy  last done 2021          PREVIOUS CARDIAC WORKUP:      Echo:  Stress Test:  Cardiac Cath:    ALLERGIES:    shellfish (Unknown)  No Known Drug Allergies       MEDICATIONS  (STANDING):  aspirin  chewable 81 milliGRAM(s) Oral daily  cholecalciferol 2000 Unit(s) Oral daily  dextrose 5%. 1000 milliLiter(s) (100 mL/Hr) IV Continuous <Continuous>  dextrose 5%. 1000 milliLiter(s) (50 mL/Hr) IV Continuous <Continuous>  dextrose 50% Injectable 25 Gram(s) IV Push once  dextrose 50% Injectable 25 Gram(s) IV Push once  dextrose 50% Injectable 12.5 Gram(s) IV Push once  enoxaparin Injectable 40 milliGRAM(s) SubCutaneous every 24 hours  escitalopram 10 milliGRAM(s) Oral daily  finasteride 5 milliGRAM(s) Oral daily  glucagon  Injectable 1 milliGRAM(s) IntraMuscular once  insulin lispro (ADMELOG) corrective regimen sliding scale   SubCutaneous three times a day before meals  insulin lispro (ADMELOG) corrective regimen sliding scale   SubCutaneous at bedtime  latanoprost 0.005% Ophthalmic Solution 1 Drop(s) Both EYES at bedtime  losartan 50 milliGRAM(s) Oral daily  metoprolol succinate ER 50 milliGRAM(s) Oral daily  rosuvastatin 10 milliGRAM(s) Oral at bedtime    MEDICATIONS  (PRN):  aluminum hydroxide/magnesium hydroxide/simethicone Suspension 30 milliLiter(s) Oral every 4 hours PRN Dyspepsia  dextrose Oral Gel 15 Gram(s) Oral once PRN Blood Glucose LESS THAN 70 milliGRAM(s)/deciliter  pantoprazole    Tablet 40 milliGRAM(s) Oral before breakfast PRN for indigestion      FAMILY HISTORY:  Family hx of hypertension (Mother)          SOCIAL HISTORY:  .scl    ROS:     .ros    Vital Signs Last 24 Hrs  T(C): 36.6 (10 Nov 2023 05:30), Max: 37.6 (09 Nov 2023 20:18)  T(F): 97.9 (10 Nov 2023 05:30), Max: 99.6 (09 Nov 2023 20:18)  HR: 58 (10 Nov 2023 05:30) (58 - 66)  BP: 120/72 (10 Nov 2023 05:30) (120/72 - 144/87)  BP(mean): 87 (10 Nov 2023 05:30) (87 - 87)  RR: 16 (10 Nov 2023 05:30) (16 - 19)  SpO2: 98% (10 Nov 2023 05:30) (98% - 99%)    Parameters below as of 10 Nov 2023 05:30  Patient On (Oxygen Delivery Method): room air          PHYSICAL EXAM:    .phy      TELEMETRY:    ECG:    LABS:                          13.4   10.61 )-----------( 187      ( 09 Nov 2023 21:15 )             38.9     11-09    134<L>  |  100  |  26<H>  ----------------------------<  158<H>  4.3   |  26  |  1.13    Ca    9.1      09 Nov 2023 21:15    TPro  7.3  /  Alb  3.8  /  TBili  0.9  /  DBili  x   /  AST  20  /  ALT  20  /  AlkPhos  54  11-09          PT/INR - ( 09 Nov 2023 21:15 )   PT: 12.9 sec;   INR: 1.15 ratio    PTT - ( 09 Nov 2023 21:15 )  PTT:30.4 sec      RADIOLOGY & ADDITIONAL STUDIES:

## 2023-11-10 NOTE — PATIENT PROFILE ADULT - FALL HARM RISK - HARM RISK INTERVENTIONS

## 2023-11-10 NOTE — CHART NOTE - NSCHARTNOTEFT_GEN_A_CORE
Patient developed fever of 102. UA negative / CXR negative / COVID and RVP negative. Source unclear. Will start Vancomycin / Cefepime and consult ID consult. S/p IVF in ED. Does not meet sepsis criteria

## 2023-11-10 NOTE — H&P ADULT - HISTORY OF PRESENT ILLNESS
83 M with Hx of HTN, DM, hyperlipidemia, presented to the ED last night with altered mental status.  At this time, history has been taken by review of old records and the ED notes as patient is unable to let me know why he was sent to the hospital.  According to the notes, he was experiencing 1 day of increasing fatigue, confusion, disorientation, headache and unsteady gait at home.  He was evaluated by his PCP who sent him to the ED to be evaluated and admitted.  In the ED, the onset of symptoms were not clear.  Code stroke was not initiated.  NIHSS in the ED = 0  There was not reported new medications started at home and no increase in the dose of his medications.  No reported fevers, chills, shakes, cough, shortness of breath, nausea, vomiting, dysuria, abdominal pain, chest pain, dizziness, diarrhea.      In the ED, he had a CT of the head which was negative.  Admitting blood work was essentially unremarkable.  EKG in the ED showed sinus rhythm, but in the ED on the tele monitor he had 2 cardiac pauses, one of 1.4 seconds and a second of 2 seconds.      PAST MEDICAL HISTORY:  HTN  Hyperlipidemia  Type 2 Diabetes Mellitus  Paroxysmal Atrial Fibrillation, not on A/C  Glaucoma  Macular Degeneration  Diabetic Neuropathy  BPH    PAST SURGICAL HISTORY:  s/p left knee replacement  s/p hernia repair  s/p nasal septal surgery      FAMILY HISTORY:   Hx of HTN

## 2023-11-11 ENCOUNTER — TRANSCRIPTION ENCOUNTER (OUTPATIENT)
Age: 83
End: 2023-11-11

## 2023-11-11 VITALS
HEART RATE: 63 BPM | DIASTOLIC BLOOD PRESSURE: 104 MMHG | RESPIRATION RATE: 18 BRPM | OXYGEN SATURATION: 98 % | SYSTOLIC BLOOD PRESSURE: 158 MMHG | TEMPERATURE: 98 F

## 2023-11-11 LAB
A1C WITH ESTIMATED AVERAGE GLUCOSE RESULT: 7.1 % — HIGH (ref 4–5.6)
A1C WITH ESTIMATED AVERAGE GLUCOSE RESULT: 7.1 % — HIGH (ref 4–5.6)
ADD ON TEST-SPECIMEN IN LAB: SIGNIFICANT CHANGE UP
ADD ON TEST-SPECIMEN IN LAB: SIGNIFICANT CHANGE UP
ANION GAP SERPL CALC-SCNC: 7 MMOL/L — SIGNIFICANT CHANGE UP (ref 5–17)
ANION GAP SERPL CALC-SCNC: 7 MMOL/L — SIGNIFICANT CHANGE UP (ref 5–17)
B BURGDOR C6 AB SER-ACNC: NEGATIVE — SIGNIFICANT CHANGE UP
B BURGDOR C6 AB SER-ACNC: NEGATIVE — SIGNIFICANT CHANGE UP
B BURGDOR IGG+IGM SER-ACNC: 0.03 INDEX — SIGNIFICANT CHANGE UP (ref 0.01–0.89)
B BURGDOR IGG+IGM SER-ACNC: 0.03 INDEX — SIGNIFICANT CHANGE UP (ref 0.01–0.89)
BABESIA MICROTI PCR, BLD RESULT: SIGNIFICANT CHANGE UP
BABESIA MICROTI PCR, BLD RESULT: SIGNIFICANT CHANGE UP
BUN SERPL-MCNC: 22 MG/DL — SIGNIFICANT CHANGE UP (ref 7–23)
BUN SERPL-MCNC: 22 MG/DL — SIGNIFICANT CHANGE UP (ref 7–23)
CALCIUM SERPL-MCNC: 8.9 MG/DL — SIGNIFICANT CHANGE UP (ref 8.5–10.1)
CALCIUM SERPL-MCNC: 8.9 MG/DL — SIGNIFICANT CHANGE UP (ref 8.5–10.1)
CHLORIDE SERPL-SCNC: 105 MMOL/L — SIGNIFICANT CHANGE UP (ref 96–108)
CHLORIDE SERPL-SCNC: 105 MMOL/L — SIGNIFICANT CHANGE UP (ref 96–108)
CHOLEST SERPL-MCNC: 130 MG/DL — SIGNIFICANT CHANGE UP
CHOLEST SERPL-MCNC: 130 MG/DL — SIGNIFICANT CHANGE UP
CO2 SERPL-SCNC: 27 MMOL/L — SIGNIFICANT CHANGE UP (ref 22–31)
CO2 SERPL-SCNC: 27 MMOL/L — SIGNIFICANT CHANGE UP (ref 22–31)
CREAT SERPL-MCNC: 1.05 MG/DL — SIGNIFICANT CHANGE UP (ref 0.5–1.3)
CREAT SERPL-MCNC: 1.05 MG/DL — SIGNIFICANT CHANGE UP (ref 0.5–1.3)
CRP SERPL-MCNC: <3 MG/L — SIGNIFICANT CHANGE UP
CRP SERPL-MCNC: <3 MG/L — SIGNIFICANT CHANGE UP
CULTURE RESULTS: SIGNIFICANT CHANGE UP
CULTURE RESULTS: SIGNIFICANT CHANGE UP
EGFR: 70 ML/MIN/1.73M2 — SIGNIFICANT CHANGE UP
EGFR: 70 ML/MIN/1.73M2 — SIGNIFICANT CHANGE UP
ERYTHROCYTE [SEDIMENTATION RATE] IN BLOOD: 17 MM/HR — SIGNIFICANT CHANGE UP (ref 0–20)
ERYTHROCYTE [SEDIMENTATION RATE] IN BLOOD: 17 MM/HR — SIGNIFICANT CHANGE UP (ref 0–20)
ESTIMATED AVERAGE GLUCOSE: 157 MG/DL — HIGH (ref 68–114)
ESTIMATED AVERAGE GLUCOSE: 157 MG/DL — HIGH (ref 68–114)
FERRITIN SERPL-MCNC: 326 NG/ML — SIGNIFICANT CHANGE UP (ref 30–400)
FERRITIN SERPL-MCNC: 326 NG/ML — SIGNIFICANT CHANGE UP (ref 30–400)
GLUCOSE BLDC GLUCOMTR-MCNC: 138 MG/DL — HIGH (ref 70–99)
GLUCOSE BLDC GLUCOMTR-MCNC: 138 MG/DL — HIGH (ref 70–99)
GLUCOSE BLDC GLUCOMTR-MCNC: 151 MG/DL — HIGH (ref 70–99)
GLUCOSE BLDC GLUCOMTR-MCNC: 151 MG/DL — HIGH (ref 70–99)
GLUCOSE BLDC GLUCOMTR-MCNC: 253 MG/DL — HIGH (ref 70–99)
GLUCOSE BLDC GLUCOMTR-MCNC: 253 MG/DL — HIGH (ref 70–99)
GLUCOSE SERPL-MCNC: 126 MG/DL — HIGH (ref 70–99)
GLUCOSE SERPL-MCNC: 126 MG/DL — HIGH (ref 70–99)
HCT VFR BLD CALC: 38.6 % — LOW (ref 39–50)
HCT VFR BLD CALC: 38.6 % — LOW (ref 39–50)
HDLC SERPL-MCNC: 54 MG/DL — SIGNIFICANT CHANGE UP
HDLC SERPL-MCNC: 54 MG/DL — SIGNIFICANT CHANGE UP
HGB BLD-MCNC: 13.1 G/DL — SIGNIFICANT CHANGE UP (ref 13–17)
HGB BLD-MCNC: 13.1 G/DL — SIGNIFICANT CHANGE UP (ref 13–17)
LDH SERPL L TO P-CCNC: 170 U/L — SIGNIFICANT CHANGE UP (ref 84–241)
LDH SERPL L TO P-CCNC: 170 U/L — SIGNIFICANT CHANGE UP (ref 84–241)
LIPID PNL WITH DIRECT LDL SERPL: 60 MG/DL — SIGNIFICANT CHANGE UP
LIPID PNL WITH DIRECT LDL SERPL: 60 MG/DL — SIGNIFICANT CHANGE UP
MCHC RBC-ENTMCNC: 32 PG — SIGNIFICANT CHANGE UP (ref 27–34)
MCHC RBC-ENTMCNC: 32 PG — SIGNIFICANT CHANGE UP (ref 27–34)
MCHC RBC-ENTMCNC: 33.9 GM/DL — SIGNIFICANT CHANGE UP (ref 32–36)
MCHC RBC-ENTMCNC: 33.9 GM/DL — SIGNIFICANT CHANGE UP (ref 32–36)
MCV RBC AUTO: 94.1 FL — SIGNIFICANT CHANGE UP (ref 80–100)
MCV RBC AUTO: 94.1 FL — SIGNIFICANT CHANGE UP (ref 80–100)
NON HDL CHOLESTEROL: 75 MG/DL — SIGNIFICANT CHANGE UP
NON HDL CHOLESTEROL: 75 MG/DL — SIGNIFICANT CHANGE UP
PLATELET # BLD AUTO: 171 K/UL — SIGNIFICANT CHANGE UP (ref 150–400)
PLATELET # BLD AUTO: 171 K/UL — SIGNIFICANT CHANGE UP (ref 150–400)
POTASSIUM SERPL-MCNC: 3.9 MMOL/L — SIGNIFICANT CHANGE UP (ref 3.5–5.3)
POTASSIUM SERPL-MCNC: 3.9 MMOL/L — SIGNIFICANT CHANGE UP (ref 3.5–5.3)
POTASSIUM SERPL-SCNC: 3.9 MMOL/L — SIGNIFICANT CHANGE UP (ref 3.5–5.3)
POTASSIUM SERPL-SCNC: 3.9 MMOL/L — SIGNIFICANT CHANGE UP (ref 3.5–5.3)
RBC # BLD: 4.1 M/UL — LOW (ref 4.2–5.8)
RBC # BLD: 4.1 M/UL — LOW (ref 4.2–5.8)
RBC # FLD: 13.2 % — SIGNIFICANT CHANGE UP (ref 10.3–14.5)
RBC # FLD: 13.2 % — SIGNIFICANT CHANGE UP (ref 10.3–14.5)
SODIUM SERPL-SCNC: 139 MMOL/L — SIGNIFICANT CHANGE UP (ref 135–145)
SODIUM SERPL-SCNC: 139 MMOL/L — SIGNIFICANT CHANGE UP (ref 135–145)
SPECIMEN SOURCE: SIGNIFICANT CHANGE UP
SPECIMEN SOURCE: SIGNIFICANT CHANGE UP
T PALLIDUM AB TITR SER: NEGATIVE — SIGNIFICANT CHANGE UP
T PALLIDUM AB TITR SER: NEGATIVE — SIGNIFICANT CHANGE UP
TRIGL SERPL-MCNC: 79 MG/DL — SIGNIFICANT CHANGE UP
TRIGL SERPL-MCNC: 79 MG/DL — SIGNIFICANT CHANGE UP
TSH SERPL-MCNC: 3.05 UU/ML — SIGNIFICANT CHANGE UP (ref 0.34–4.82)
TSH SERPL-MCNC: 3.05 UU/ML — SIGNIFICANT CHANGE UP (ref 0.34–4.82)
VIT B12 SERPL-MCNC: 494 PG/ML — SIGNIFICANT CHANGE UP (ref 232–1245)
VIT B12 SERPL-MCNC: 494 PG/ML — SIGNIFICANT CHANGE UP (ref 232–1245)
WBC # BLD: 8.14 K/UL — SIGNIFICANT CHANGE UP (ref 3.8–10.5)
WBC # BLD: 8.14 K/UL — SIGNIFICANT CHANGE UP (ref 3.8–10.5)
WBC # FLD AUTO: 8.14 K/UL — SIGNIFICANT CHANGE UP (ref 3.8–10.5)
WBC # FLD AUTO: 8.14 K/UL — SIGNIFICANT CHANGE UP (ref 3.8–10.5)

## 2023-11-11 PROCEDURE — 99239 HOSP IP/OBS DSCHRG MGMT >30: CPT

## 2023-11-11 PROCEDURE — 99222 1ST HOSP IP/OBS MODERATE 55: CPT

## 2023-11-11 PROCEDURE — 95816 EEG AWAKE AND DROWSY: CPT | Mod: 26

## 2023-11-11 RX ORDER — LOSARTAN POTASSIUM 100 MG/1
50 TABLET, FILM COATED ORAL ONCE
Refills: 0 | Status: COMPLETED | OUTPATIENT
Start: 2023-11-11 | End: 2023-11-11

## 2023-11-11 RX ORDER — LOSARTAN POTASSIUM 100 MG/1
1 TABLET, FILM COATED ORAL
Qty: 0 | Refills: 0 | DISCHARGE

## 2023-11-11 RX ORDER — METOPROLOL TARTRATE 50 MG
1 TABLET ORAL
Refills: 0 | DISCHARGE

## 2023-11-11 RX ORDER — METOPROLOL TARTRATE 50 MG
1 TABLET ORAL
Qty: 30 | Refills: 0
Start: 2023-11-11

## 2023-11-11 RX ADMIN — Medication 3: at 12:23

## 2023-11-11 RX ADMIN — LOSARTAN POTASSIUM 50 MILLIGRAM(S): 100 TABLET, FILM COATED ORAL at 11:08

## 2023-11-11 RX ADMIN — CEFEPIME 1000 MILLIGRAM(S): 1 INJECTION, POWDER, FOR SOLUTION INTRAMUSCULAR; INTRAVENOUS at 06:03

## 2023-11-11 RX ADMIN — Medication 250 MILLIGRAM(S): at 06:03

## 2023-11-11 RX ADMIN — ESCITALOPRAM OXALATE 10 MILLIGRAM(S): 10 TABLET, FILM COATED ORAL at 11:08

## 2023-11-11 RX ADMIN — Medication 2000 UNIT(S): at 11:07

## 2023-11-11 RX ADMIN — Medication 81 MILLIGRAM(S): at 11:08

## 2023-11-11 RX ADMIN — LOSARTAN POTASSIUM 50 MILLIGRAM(S): 100 TABLET, FILM COATED ORAL at 15:38

## 2023-11-11 RX ADMIN — FINASTERIDE 5 MILLIGRAM(S): 5 TABLET, FILM COATED ORAL at 11:07

## 2023-11-11 RX ADMIN — Medication 50 MILLIGRAM(S): at 11:07

## 2023-11-11 NOTE — DISCHARGE NOTE NURSING/CASE MANAGEMENT/SOCIAL WORK - NURSING SECTION COMPLETE
Ladonna is a 75 year old who is being evaluated via a billable video visit.      How would you like to obtain your AVS? [employer]  If the video visit is dropped, the invitation should be resent by: Other e-mail: [email or web address]  Will anyone else be joining your video visit? No      Video Start Time: 2:38 PM  Video-Visit Details    Type of service:  Video Visit    Video End Time:3:56 PM    Originating Location (pt. Location): Home    Distant Location (provider location):  [facility] [place]     Platform used for Video Visit: [employer], phone.    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  This was 2 weeks ago.  Last week I evaluated her via video and she reported for the lesions to be not getting better nor worse but noticed that they were not new ones coming up.  Since last visit her rash has actually gotten worse, the lesions have become confluent, larger in size and more extensive.  They seem different than the original rash but continue to be itchy, they are now scaling and peeling.  She saw her primary care physician yesterday who is with the [employer] system her name is Dr. Sandra Holden in [facility] phone .   Saw has an appointment tomorrow morning at 9  she referred her to a dermatologist in the morning.    No other issues or concerns.    I contacted Dr. Eagle from dermatology and sent her some pictures from Ladonna's rash they were sent to me by her son Bandar.  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  He will see her on February 25 to see if she is doing better.    In the meantime she will continue with the cream and is up to her if she wants to see another dermatologist tomorrow.    Regarding the Soliris infusion I will continue to hold it until her rash is completely gone.  Once this happens I will consider a low-dose of 300 mg with 1 g Solu-Medrol prior to the infusion.  Alternatively I can consider Satralizumab and IL 6 antagonist.  The patient will be seen again in 1 week and understands and agrees with the plan.    A total of 45 minutes were spent with the patient, reviewing pictures, contacting the dermatologist and awaiting for response as well as ordering prednisone and coordinating care.   Patient/Caregiver provided printed discharge information.

## 2023-11-11 NOTE — CONSULT NOTE ADULT - ASSESSMENT
82 y/o man with h/o HTN, DM type 2, hyperlipidemia was admitted on 11/10 for increased confusion, during hospitalization had fever spike, placed on anti bx, also noted in tele monitor had 2 cardiac pauses. On exam non focal, no meningeal signs, no HA, neck is supple. CT of the head was negative. An MR of the head was attempted but not able to tolerate. EEG is normal. His mental state is improved.  No clear neurological cause. Likely encephalopathy due to ? underlying infection.  suggest:  f/u fever w/u  cardiology to f/u  Can get MR head wo, if doesn't resolve.  likely would need sedation  PT evaluation

## 2023-11-11 NOTE — DISCHARGE NOTE PROVIDER - NSDCMRMEDTOKEN_GEN_ALL_CORE_FT
azelastine 137 mcg/inh (0.1%) nasal spray: 1 spray(s) in each nostril 2 times a day  escitalopram 10 mg oral tablet: 1 tab(s) orally once (at bedtime)  finasteride 5 mg oral tablet: 1 tab(s) orally once a day (in the evening)  Flonase 50 mcg/inh nasal spray: 1 spray(s) nasal once a day as needed for  allergy symptoms  latanoprost 0.005% ophthalmic solution: 1 drop(s) in each eye once a day (in the evening)  losartan 100 mg oral tablet: 1 tab(s) orally once a day  metFORMIN 500 mg oral tablet, extended release: 1 tab(s) orally 2 times a day  metoprolol succinate 50 mg oral tablet, extended release: 1 tab(s) orally once a day  pantoprazole 40 mg oral delayed release tablet: 1 tab(s) orally 2 times a day as needed for  indigestion  PreserVision AREDS 2 oral capsule: 1 cap(s) orally 2 times a day  rosuvastatin 10 mg oral tablet: 1 tab(s) orally once a day (in the morning)  Vitamin D3 50 mcg (2000 intl units) oral tablet: 1 tab(s) orally once a day (in the morning)

## 2023-11-11 NOTE — PHYSICAL THERAPY INITIAL EVALUATION ADULT - PLANNED THERAPY INTERVENTIONS, PT EVAL
GOAL: Pt will perform 6 stairs with or without U HR as needed within 4weeks./balance training/gait training/strengthening/transfer training

## 2023-11-11 NOTE — CONSULT NOTE ADULT - SUBJECTIVE AND OBJECTIVE BOX
Patient is a 83y old  Male who presents with a chief complaint of altered mental status     HPI:  82 y/o Male with h/o HTN, DM type 2, hyperlipidemia was admitted on 11/10 for increased confusion x one day. In ED the patient was unable to state why he was sent to the hospital. According to the notes, he was experiencing 1 day of increasing fatigue, confusion, disorientation, headache and unsteady gait at home. He was evaluated by his PCP who sent him to the ED to be evaluated and admitted. No reported fevers, chills, shakes, cough, shortness of breath, nausea, vomiting, dysuria, abdominal pain, chest pain, dizziness, diarrhea. In the ED, he had a CT of the head which was negative and on the tele monitor he had 2 cardiac pauses, one of 1.4 seconds and a second of 2 seconds. He received vancomycin IV x 1 and cefepime IV x 1.    PAST MEDICAL HISTORY:  HTN  Hyperlipidemia  Type 2 Diabetes Mellitus  Paroxysmal Atrial Fibrillation, not on A/C  Glaucoma  Macular Degeneration  Diabetic Neuropathy  BPH    PAST SURGICAL HISTORY:  s/p left knee replacement  s/p hernia repair  s/p nasal septal surgery    Meds: per reconciliation sheet, noted below  MEDICATIONS  (STANDING):  aspirin  chewable 81 milliGRAM(s) Oral daily  cholecalciferol 2000 Unit(s) Oral daily  dextrose 5%. 1000 milliLiter(s) (50 mL/Hr) IV Continuous <Continuous>  dextrose 5%. 1000 milliLiter(s) (100 mL/Hr) IV Continuous <Continuous>  dextrose 50% Injectable 25 Gram(s) IV Push once  dextrose 50% Injectable 25 Gram(s) IV Push once  dextrose 50% Injectable 12.5 Gram(s) IV Push once  enoxaparin Injectable 40 milliGRAM(s) SubCutaneous every 24 hours  escitalopram 10 milliGRAM(s) Oral daily  finasteride 5 milliGRAM(s) Oral daily  glucagon  Injectable 1 milliGRAM(s) IntraMuscular once  insulin lispro (ADMELOG) corrective regimen sliding scale   SubCutaneous at bedtime  insulin lispro (ADMELOG) corrective regimen sliding scale   SubCutaneous three times a day before meals  latanoprost 0.005% Ophthalmic Solution 1 Drop(s) Both EYES at bedtime  losartan 50 milliGRAM(s) Oral daily  metoprolol succinate ER 50 milliGRAM(s) Oral daily  rosuvastatin 10 milliGRAM(s) Oral at bedtime    MEDICATIONS  (PRN):  acetaminophen     Tablet .. 650 milliGRAM(s) Oral every 6 hours PRN Mild Pain (1 - 3)  aluminum hydroxide/magnesium hydroxide/simethicone Suspension 30 milliLiter(s) Oral every 4 hours PRN Dyspepsia  dextrose Oral Gel 15 Gram(s) Oral once PRN Blood Glucose LESS THAN 70 milliGRAM(s)/deciliter  pantoprazole    Tablet 40 milliGRAM(s) Oral before breakfast PRN for indigestion    Allergies    shellfish (Unknown)  No Known Drug Allergies    Intolerances      Social: no smoking, no alcohol, no illegal drugs; no recent travel, no exposure to TB  FAMILY HISTORY:  Family hx of hypertension (Mother)      no history of premature cardiovascular disease in first degree relatives    ROS: the patient denies fever, no chills, no HA, no seizures, no dizziness, no sore throat, no nasal congestion, no blurry vision, no CP, no palpitations, no SOB, no cough, no abdominal pain, no diarrhea, no N/V, no dysuria, no leg pain, no claudication, no rash, no joint aches, no rectal pain or bleeding, no night sweats  All other systems reviewed and are negative    Vital Signs Last 24 Hrs  T(C): 36.3 (2023 08:36), Max: 38.9 (10 Nov 2023 20:32)  T(F): 97.3 (2023 08:36), Max: 102 (10 Nov 2023 20:32)  HR: 62 (2023 08:36) (48 - 64)  BP: 169/80 (2023 08:36) (124/78 - 169/80)  BP(mean): 102 (2023 08:36) (102 - 102)  RR: 18 (2023 08:36) (16 - 18)  SpO2: 100% (2023 08:36) (95% - 100%)    Parameters below as of 2023 08:36  Patient On (Oxygen Delivery Method): room air      Daily     Daily Weight in k.5 (10 Nov 2023 14:20)    PE:    Constitutional:  No acute distress  HEENT: NC/AT, EOMI, PERRLA, conjunctivae clear; ears and nose atraumatic; pharynx benign  Neck: supple; thyroid not palpable  Back: no tenderness  Respiratory: respiratory effort normal; clear to auscultation  Cardiovascular: S1S2 regular, no murmurs  Abdomen: soft, not tender, not distended, positive BS; no liver or spleen organomegaly  Genitourinary: no suprapubic tenderness  Lymphatic: no LN palpable  Musculoskeletal: no muscle tenderness, no joint swelling or tenderness  Extremities: no pedal edema  Neurological/ Psychiatric: AxOx3, judgement and insight normal; moving all extremities  Skin: no rashes; no palpable lesions    Labs: all available labs reviewed                        13.1   8.14  )-----------( 171      ( 2023 06:09 )             38.6         139  |  105  |  22  ----------------------------<  126<H>  3.9   |  27  |  1.05    Ca    8.9      2023 06:09    TPro  7.3  /  Alb  3.8  /  TBili  0.9  /  DBili  x   /  AST  20  /  ALT  20  /  AlkPhos  54       LIVER FUNCTIONS - ( 2023 21:15 )  Alb: 3.8 g/dL / Pro: 7.3 gm/dL / ALK PHOS: 54 U/L / ALT: 20 U/L / AST: 20 U/L / GGT: x                   ( @ 21:15)  [facility]      Radiology: all available radiological tests reviewed    Advanced directives addressed: full resuscitation Patient is a 83y old  Male who presents with a chief complaint of altered mental status     HPI:  82 y/o Male with h/o HTN, DM type 2, hyperlipidemia was admitted on 11/10 for increased confusion x one day. In ED the patient was unable to state why he was sent to the hospital. According to the notes, he was experiencing 1 day of increasing fatigue, confusion, disorientation, headache and unsteady gait at home. He was evaluated by his PCP who sent him to the ED to be evaluated and admitted. No reported fevers, chills, shakes, cough, shortness of breath, nausea, vomiting, dysuria, abdominal pain, chest pain, dizziness, diarrhea. In the ED, he had a CT of the head which was negative and on the tele monitor he had 2 cardiac pauses, one of 1.4 seconds and a second of 2 seconds. He received vancomycin IV x 1 and cefepime IV x 1.    PAST MEDICAL HISTORY:  HTN  Hyperlipidemia  Type 2 Diabetes Mellitus  Paroxysmal Atrial Fibrillation, not on A/C  Glaucoma  Macular Degeneration  Diabetic Neuropathy  BPH    PAST SURGICAL HISTORY:  s/p left knee replacement  s/p hernia repair  s/p nasal septal surgery    Meds: per reconciliation sheet, noted below  MEDICATIONS  (STANDING):  aspirin  chewable 81 milliGRAM(s) Oral daily  cholecalciferol 2000 Unit(s) Oral daily  dextrose 5%. 1000 milliLiter(s) (50 mL/Hr) IV Continuous <Continuous>  dextrose 5%. 1000 milliLiter(s) (100 mL/Hr) IV Continuous <Continuous>  dextrose 50% Injectable 25 Gram(s) IV Push once  dextrose 50% Injectable 25 Gram(s) IV Push once  dextrose 50% Injectable 12.5 Gram(s) IV Push once  enoxaparin Injectable 40 milliGRAM(s) SubCutaneous every 24 hours  escitalopram 10 milliGRAM(s) Oral daily  finasteride 5 milliGRAM(s) Oral daily  glucagon  Injectable 1 milliGRAM(s) IntraMuscular once  insulin lispro (ADMELOG) corrective regimen sliding scale   SubCutaneous at bedtime  insulin lispro (ADMELOG) corrective regimen sliding scale   SubCutaneous three times a day before meals  latanoprost 0.005% Ophthalmic Solution 1 Drop(s) Both EYES at bedtime  losartan 50 milliGRAM(s) Oral daily  metoprolol succinate ER 50 milliGRAM(s) Oral daily  rosuvastatin 10 milliGRAM(s) Oral at bedtime    MEDICATIONS  (PRN):  acetaminophen     Tablet .. 650 milliGRAM(s) Oral every 6 hours PRN Mild Pain (1 - 3)  aluminum hydroxide/magnesium hydroxide/simethicone Suspension 30 milliLiter(s) Oral every 4 hours PRN Dyspepsia  dextrose Oral Gel 15 Gram(s) Oral once PRN Blood Glucose LESS THAN 70 milliGRAM(s)/deciliter  pantoprazole    Tablet 40 milliGRAM(s) Oral before breakfast PRN for indigestion    Allergies    shellfish (Unknown)  No Known Drug Allergies    Intolerances      Social: no smoking, no alcohol, no illegal drugs; no recent travel, no exposure to TB  FAMILY HISTORY:  Family hx of hypertension (Mother)      no history of premature cardiovascular disease in first degree relatives    ROS: the patient is confused, denies pain  All other systems reviewed and are negative    Vital Signs Last 24 Hrs  T(C): 36.3 (2023 08:36), Max: 38.9 (10 Nov 2023 20:32)  T(F): 97.3 (2023 08:36), Max: 102 (10 Nov 2023 20:32)  HR: 62 (2023 08:36) (48 - 64)  BP: 169/80 (2023 08:36) (124/78 - 169/80)  BP(mean): 102 (2023 08:36) (102 - 102)  RR: 18 (2023 08:36) (16 - 18)  SpO2: 100% (2023 08:36) (95% - 100%)    Parameters below as of 2023 08:36  Patient On (Oxygen Delivery Method): room air      Daily     Daily Weight in k.5 (10 Nov 2023 14:20)    PE:    Constitutional:  No acute distress  HEENT: NC/AT, EOMI, PERRLA, conjunctivae clear; ears and nose atraumatic; pharynx benign  Neck: supple; thyroid not palpable  Back: no tenderness  Respiratory: respiratory effort normal; crackles at bases  Cardiovascular: S1S2 regular, no murmurs  Abdomen: soft, not tender, not distended, positive BS; no liver or spleen organomegaly  Genitourinary: no suprapubic tenderness  Lymphatic: no LN palpable  Musculoskeletal: no muscle tenderness, no joint swelling or tenderness  Extremities: no pedal edema  Neurological/ Psychiatric: Alert, judgement and insight impaired; moving all extremities  Skin: no rashes; no palpable lesions    Labs: all available labs reviewed                        13.1   8.14  )-----------( 171      ( 2023 06:09 )             38.6         139  |  105  |  22  ----------------------------<  126<H>  3.9   |  27  |  1.05    Ca    8.9      2023 06:09    TPro  7.3  /  Alb  3.8  /  TBili  0.9  /  DBili  x   /  AST  20  /  ALT  20  /  AlkPhos  54       LIVER FUNCTIONS - ( 2023 21:15 )  Alb: 3.8 g/dL / Pro: 7.3 gm/dL / ALK PHOS: 54 U/L / ALT: 20 U/L / AST: 20 U/L / GGT: x           ( @ 21:15)  [facility]    Culture - Blood (collected 2023 21:15)  Source: .Blood None  Preliminary Report (2023 04:01):    No growth at 24 hours    Culture - Blood (collected 2023 21:15)  Source: .Blood None  Preliminary Report (2023 04:01):    No growth at 24 hours    Culture - Urine (collected 2023 21:01)  Source: Clean Catch Clean Catch (Midstream)  Final Report (2023 07:29):    <10,000 CFU/mL Normal Urogenital Leonora    Radiology: all available radiological tests reviewed    < from: CT Head No Cont (23 @ 22:46) >  No acute intracranial hemorrhage or mass effect.  Chronic changes, as described above.  < end of copied text >    < from: Xray Chest 1 View-PORTABLE IMMEDIATE (23 @ 21:42) >  IMPRESSION: No acute cardiopulmonary disease process.  < end of copied text >      Advanced directives addressed: full resuscitation Patient is a 83y old  Male who presents with a chief complaint of altered mental status     HPI:  84 y/o Male with h/o HTN, DM type 2, hyperlipidemia was admitted on 11/10 for increased confusion x one day. In ED the patient was unable to state why he was sent to the hospital. According to the notes, he was experiencing 1 day of increasing fatigue, confusion, disorientation, headache and unsteady gait at home. He was evaluated by his PCP who sent him to the ED to be evaluated and admitted. No reported fevers, chills, shakes, cough, shortness of breath, nausea, vomiting, dysuria, abdominal pain, chest pain, dizziness, diarrhea. In the ED, he had a CT of the head which was negative and on the tele monitor he had 2 cardiac pauses, one of 1.4 seconds and a second of 2 seconds. He received vancomycin IV x 1 and cefepime IV x 1.    PAST MEDICAL HISTORY:  HTN  Hyperlipidemia  Type 2 Diabetes Mellitus  Paroxysmal Atrial Fibrillation, not on A/C  Glaucoma  Macular Degeneration  Diabetic Neuropathy  BPH    PAST SURGICAL HISTORY:  s/p left knee replacement  s/p hernia repair  s/p nasal septal surgery    Meds: per reconciliation sheet, noted below  MEDICATIONS  (STANDING):  aspirin  chewable 81 milliGRAM(s) Oral daily  cholecalciferol 2000 Unit(s) Oral daily  dextrose 5%. 1000 milliLiter(s) (50 mL/Hr) IV Continuous <Continuous>  dextrose 5%. 1000 milliLiter(s) (100 mL/Hr) IV Continuous <Continuous>  dextrose 50% Injectable 25 Gram(s) IV Push once  dextrose 50% Injectable 25 Gram(s) IV Push once  dextrose 50% Injectable 12.5 Gram(s) IV Push once  enoxaparin Injectable 40 milliGRAM(s) SubCutaneous every 24 hours  escitalopram 10 milliGRAM(s) Oral daily  finasteride 5 milliGRAM(s) Oral daily  glucagon  Injectable 1 milliGRAM(s) IntraMuscular once  insulin lispro (ADMELOG) corrective regimen sliding scale   SubCutaneous at bedtime  insulin lispro (ADMELOG) corrective regimen sliding scale   SubCutaneous three times a day before meals  latanoprost 0.005% Ophthalmic Solution 1 Drop(s) Both EYES at bedtime  losartan 50 milliGRAM(s) Oral daily  metoprolol succinate ER 50 milliGRAM(s) Oral daily  rosuvastatin 10 milliGRAM(s) Oral at bedtime    MEDICATIONS  (PRN):  acetaminophen     Tablet .. 650 milliGRAM(s) Oral every 6 hours PRN Mild Pain (1 - 3)  aluminum hydroxide/magnesium hydroxide/simethicone Suspension 30 milliLiter(s) Oral every 4 hours PRN Dyspepsia  dextrose Oral Gel 15 Gram(s) Oral once PRN Blood Glucose LESS THAN 70 milliGRAM(s)/deciliter  pantoprazole    Tablet 40 milliGRAM(s) Oral before breakfast PRN for indigestion    Allergies    shellfish (Unknown)  No Known Drug Allergies    Intolerances      Social: no smoking, no alcohol, no illegal drugs; no recent travel, no exposure to TB  FAMILY HISTORY:  Family hx of hypertension (Mother)      no history of premature cardiovascular disease in first degree relatives    ROS: the patient is mild confused, denies pain, no HA  All other systems reviewed and are negative    Vital Signs Last 24 Hrs  T(C): 36.3 (2023 08:36), Max: 38.9 (10 Nov 2023 20:32)  T(F): 97.3 (2023 08:36), Max: 102 (10 Nov 2023 20:32)  HR: 62 (2023 08:36) (48 - 64)  BP: 169/80 (2023 08:36) (124/78 - 169/80)  BP(mean): 102 (2023 08:36) (102 - 102)  RR: 18 (2023 08:36) (16 - 18)  SpO2: 100% (2023 08:36) (95% - 100%)    Parameters below as of 2023 08:36  Patient On (Oxygen Delivery Method): room air      Daily     Daily Weight in k.5 (10 Nov 2023 14:20)    PE:    Constitutional:  No acute distress  HEENT: NC/AT, EOMI, PERRLA, conjunctivae clear; ears and nose atraumatic; pharynx benign  Neck: supple; thyroid not palpable  Back: no tenderness  Respiratory: respiratory effort normal; crackles at bases  Cardiovascular: S1S2 regular, no murmurs  Abdomen: soft, not tender, not distended, positive BS; no liver or spleen organomegaly  Genitourinary: no suprapubic tenderness  Lymphatic: no LN palpable  Musculoskeletal: no muscle tenderness, no joint swelling or tenderness  Extremities: no pedal edema  Neurological/ Psychiatric: Alert, judgement and insight impaired; moving all extremities  Skin: no rashes; no palpable lesions    Labs: all available labs reviewed                        13.1   8.14  )-----------( 171      ( 2023 06:09 )             38.6         139  |  105  |  22  ----------------------------<  126<H>  3.9   |  27  |  1.05    Ca    8.9      2023 06:09    TPro  7.3  /  Alb  3.8  /  TBili  0.9  /  DBili  x   /  AST  20  /  ALT  20  /  AlkPhos  54       LIVER FUNCTIONS - ( 2023 21:15 )  Alb: 3.8 g/dL / Pro: 7.3 gm/dL / ALK PHOS: 54 U/L / ALT: 20 U/L / AST: 20 U/L / GGT: x           ( @ 21:15)  [facility]    Culture - Blood (collected 2023 21:15)  Source: .Blood None  Preliminary Report (2023 04:01):    No growth at 24 hours    Culture - Blood (collected 2023 21:15)  Source: .Blood None  Preliminary Report (2023 04:01):    No growth at 24 hours    Culture - Urine (collected 2023 21:01)  Source: Clean Catch Clean Catch (Midstream)  Final Report (2023 07:29):    <10,000 CFU/mL Normal Urogenital Leonora    Radiology: all available radiological tests reviewed    < from: CT Head No Cont (23 @ 22:46) >  No acute intracranial hemorrhage or mass effect.  Chronic changes, as described above.  < end of copied text >    < from: Xray Chest 1 View-PORTABLE IMMEDIATE (23 @ 21:42) >  IMPRESSION: No acute cardiopulmonary disease process.  < end of copied text >      Advanced directives addressed: full resuscitation

## 2023-11-11 NOTE — DISCHARGE NOTE PROVIDER - HOSPITAL COURSE
83 year old man with DM, HTN, HLD, paroxysmal afib not on AC, BPH, mild cognitive impairment, had been in his usual state of health when he was noted to have worsened confusion, fatigue, generalized weakness, unsteady gait. No new or changed medications. No recent illness or sick contact. No other clear complaints. He was brought by family to his Primary Care provider's office and he was sent to the ED from there. In the ED, he was afebrile, BP 140s/80s, HR 60s, RR 18 O2 sat normal. Documented ED exam was unremarkable aside for cognitive impairment. NIHSS = 0. No focal neuro deficits were described. Gluc 190. WBC 10.6. normal diff. BMP NWL, LFTs WNL. Coags WNL. Lactate normal. Troponin negative x 3. EKG w. normal HR, SR, occasional PVC, nonspecific ST T change. COVID19 PCR and RVP PCR negative. UA negative. CXR with clear lungs. Cardiomediastinal silhouette is enlarged. No acute osseous abnormalities. CT head done. No evidence of acute intracranial hemorrhage, mass effect, midline shift, or acute, large territorial infarct.  The ventricles and sulci are prominent compatible with age-appropriate brain parenchymal volume loss. Patchy and confluent periventricular and subcortical white matter hypodensities are nonspecific, although likely due to chronic microangiopathy. Placed on tele and admitted to Medicine.     Acute encephalopathy, unable to rule out metabolic encephalopathy or toxic encephalopathy, unable to rule out encephalopathy due to viral syndrome  Etiology ultimately unclear. Exam, labs, cardiac monitoring all unremarkable. CXR, CT head, UA, COVID19 and other resp viral testing all negative. He did have an isolated episode of fever here after admission. No localizing symptoms to suggest source. Blood cultures negative to date. Urine culture negative. CXR clear. No abdominal complaints. He is now back to baseline. Feels well, ambulated with PT, eager to return home and family wishes to have him return home as well. Attempted MRI brain for completeness, was unable to tolerate. Doubt seizure. No sign of meningitis. Soft neck. No rigidity. No further fever. And back to baseline mentation as mentioned. can defer LP. Would have patient follow up with his neurologist Dr Garcia.    Episode of fever  Unable to rule out a viral syndrome, unspecified. ESR WNL. LDH WNL. COVID19 PCR negative. RVP PCR negative. CXR clear. UA and culture negative. Blood culture negative. Appreciate input from ID. Advised observation off antibiotics and he remains stable, afebrile, requested to be discharged. Obtained Lyme AB, babesia PCR, ehrlichia PCR, WNV, RMSF PCR, EBV, CMV, CRP, all in process. Will follow but advise close follow up with Primary Care for post discharge check up.     DM  A1c 7.1. Continue home regimen of metformin.    HTN  Suboptimally controlled, BP 130s-160s systolic. Continue home regimen metoprolol and losartan but metoprolol reduced to 50mg daily bc of rare pauses, increased losartan to 100mg daily to optimize BP control    ? Hx of paroxysmal afib  Per chart. Family unsure he carries such diagnosis. Not on any AC. NSR here. Deferring AC. Patient to follow up with Primary Care.        Discharge Exam:  Afebrile  BP 130s-150s/80s  HR 60s  RR 16-18  O2 % on RA  Gen: Comfortable, resting in armchair at bedside  HEENT: NCAT PERRL EOMI MMM clear oropharynx  Neck: Supple, no nuchal rigidity, no LAD, no thyromegaly  CVS: s1 s2 normal, RRR  Chest: Normal resp effort, lungs CTA B/L  Abd: +Bs, soft NT ND   Ext: No edema or calf tenderness, normal cap refill, no clubbing  Mood: Calm, pleasant  Neuro: Awake and alert. Oriented x2-3. Appropriately interactive, anxious affect. Speech fluent w/o paraphasic error, repetition, naming, reading is intact. CN intact. Normal bulk and tone, no tremor, rigidity or bradykinesia.  5/5 all over. Sens intact to light touch. Reflexes 0-1/4 all over, downgoing toes b/l. No dysmetria, SUSANA intact. Gait steady, benefits from with rollator which he has at home.     Labs:                         13.1   8.14  )---------( 171                  38.6       139  |  105  |  22  ----------------------<  126  3.9   |  27  |  1.05    Ca    8.9        TPro  7.3  /  Alb  3.8  /  TBili  0.9  /  DBili  x   /  AST  20  /  ALT  20  /  AlkPhos  54      PT: 12.9 sec;   INR: 1.15 ratio      Lactate normal    Troponin negative x 3    ESR WNL  LDH WNL    UA negative       COVID19 PCR and RVP PCR negative       Urine and blood culture negative    Lyme AB, babesia PCR, ehrlichia PCR, WNV, RMSF PCR, EBV, CMV all in process.    Cardiac Testing:  Tele: SR, rate WNL, rare pauses 1-2 seconds    EKG: Normal HR, SR, occasional PVC, nonspecific ST T change.     Imaging:  CXR: Clear lungs. Cardiomediastinal silhouette is enlarged. No acute osseous abnormalities.     CT head: No evidence of acute intracranial hemorrhage, mass effect, midline shift, or acute, large territorial infarct.  The ventricles and sulci are prominent compatible with age-appropriate brain parenchymal volume loss. Patchy and confluent periventricular and subcortical white matter hypodensities are nonspecific, although likely due to chronic microangiopathy. Placed on tele and admitted to Medicine.

## 2023-11-11 NOTE — DISCHARGE NOTE PROVIDER - PROVIDER TOKENS
PROVIDER:[TOKEN:[[id number]],FOLLOWUP:[1 week]],PROVIDER:[TOKEN:[[id number]],FOLLOWUP:[2 weeks]]

## 2023-11-11 NOTE — DISCHARGE NOTE PROVIDER - NSDCCPCAREPLAN_GEN_ALL_CORE_FT
PRINCIPAL DISCHARGE DIAGNOSIS  Diagnosis: Altered mental status  Assessment and Plan of Treatment:   Acute encephalopathy, unable to rule out metabolic encephalopathy or toxic encephalopathy, unable to rule out encephalopathy due to viral syndrome  Etiology ultimately unclear. Exam, labs, cardiac monitoring all unremarkable. CXR, CT head, UA, COVID19 and other resp viral testing all negative. He did have an isolated episode of fever here after admission. No localizing symptoms to suggest source. Blood cultures negative to date. Urine culture negative. CXR clear. No abdominal complaints. He is now back to baseline. Feels well, ambulated with PT, eager to return home and family wishes to have him return home as well. Attempted MRI brain for completeness, was unable to tolerate. Doubt seizure. No sign of meningitis. Soft neck. No rigidity. No further fever. And back to baseline mentation as mentioned. can defer LP. Would have patient follow up with his neurologist Dr Garcia.      SECONDARY DISCHARGE DIAGNOSES  Diagnosis: Fever  Assessment and Plan of Treatment: You were also noted to have an isolated episode of fever this admission. Unable to rule out a viral syndrome. You did test negative for COVID19 and other respiratory viral illnesses. No clinical signs of meningitis. Chest xray is clear. Urinalysis is negative. Urine culture is normal and blood cultures are negative to date. Infammatory markers are normal. Appreciate input from Infectious Disease doctor who recommended observation off antibiotics. Obtained testing for Lyme, babesia, ehrlichia and several virses, all in process. Will follow but advise close follow up with Primary Care for post-discharge check-up.    Diagnosis: Diabetes mellitus  Assessment and Plan of Treatment: Well controlled. A1c 7.1. Continue home regimen of metformin.    Diagnosis: Hypertension  Assessment and Plan of Treatment: Suboptimally controlled, systolic blood pressure up to 160s here at times. Increased losartan from 50mg daily to 100mg daily, especially given metoprolol dose is being reduced because of the rare brief sinus pauses. Please follow up with Dr Jones within 1 week for blood pressure check and to obtain blood work (chemistry test to check potassium level and kidney function).    Diagnosis: Sinus pause  Assessment and Plan of Treatment: Brief, 1-2 sec, as noted on tele. Reduced metoprolol dose. Documentation lists history of paroxysmal atrial fibrillation but you and family are not aware of such. Youre not on any anticoagulation, which might be indicated if you carried such a history. Please follow up with your Primary Care Dr Jones and clarify history of afib or not and whether blood thinner might be appropriate.

## 2023-11-11 NOTE — PHYSICAL THERAPY INITIAL EVALUATION ADULT - ADDITIONAL COMMENTS
The pt is typically very independent. -information from May 2022/ indep, forgetful at baseline. +drives. Pt has RW and rollator at home, dtr checks in often. pt willing to use rollator, declining home PT.

## 2023-11-11 NOTE — PHYSICAL THERAPY INITIAL EVALUATION ADULT - PERTINENT HX OF CURRENT PROBLEM, REHAB EVAL
83 M with Hx of HTN, DM, hyperlipidemia, presented to the ED last night with altered mental status.     CT HEAD: No acute intracranial hemorrhage or mass effect. Chronic changes, as described above.    Pt awaiting MRI to r/o CVA. 83 M with Hx of HTN, DM, hyperlipidemia, presented to the ED last night with altered mental status.     CT HEAD: No acute intracranial hemorrhage or mass effect. Chronic changes, as described above.

## 2023-11-11 NOTE — PHYSICAL THERAPY INITIAL EVALUATION ADULT - GENERAL OBSERVATIONS, REHAB EVAL
Pt seen for 45min PT Eval. Pt rec'd sitting in chair in NAD, dtr bedside, A&Ox3 as per dtr "back to normal." Pt trans and amb 50ft with SBA holding onto rail in hinkle at times, dem dec step length slightly unsteady with turns would benefit from RW/rollator has both at home agreeing to use upon DC. Pt declining home PT dtr aware. Pt left in chair in NAD, all needs met, +alarm, RN aware.

## 2023-11-11 NOTE — CONSULT NOTE ADULT - SUBJECTIVE AND OBJECTIVE BOX
Neurology Consult requested by:   Patient is a 83y old  Male who presents with a chief complaint of altered mental status (11 Nov 2023 09:08)     HPI:      83 man RH with Hx of HTN, DM, hyperlipidemia, presented to the ED last night with altered mental status.  as per daughter at bedside, this past Thursday the family noted, he was confused to his meds, no recent illnesses, no fever or chills. Brought to the ED as his confusion worsened. In the ED, he had a CT of the head which was negative.  Admitting blood work was essentially unremarkable.  EKG in the ED showed sinus rhythm, but in the ED on the tele monitor he had 2 cardiac pauses, one of 1.4 seconds and a second of 2 seconds.    Today he feels better, denies any headache, dizziness, vision changes, no weakness, no trouble eating breakfast, wants to go home.    PAST MEDICAL HISTORY:  HTN  Hyperlipidemia  Type 2 Diabetes Mellitus  Paroxysmal Atrial Fibrillation, not on A/C  Glaucoma  Macular Degeneration  Diabetic Neuropathy  BPH    PAST SURGICAL HISTORY:  s/p left knee replacement  s/p hernia repair  s/p nasal septal surgery      FAMILY HISTORY:   Hx of HTN (10 Nov 2023 08:50)      PAST MEDICAL & SURGICAL HISTORY:  HTN (hypertension)      HLD (hyperlipidemia)      Type II diabetes mellitus      Macular degeneration      Anxiety      Urinary incontinence      BPH (benign prostatic hyperplasia)      Nocturia      DNS (deviated nasal septum)  history of. Corrected with surgery      [place] (hard of hearing)  Bilateral hearing aids      Osteoarthritis  left knee      History of nasal septoplasty  due to deviated septum      S/P hernia repair      H/O laser photocoagulation of retina      History of prostate surgery  [facility] 4/ 2019      History of colonoscopy  last done 2021        FAMILY HISTORY:  Family hx of hypertension (Mother)      Social Hx:  Nonsmoker, no drug or alcohol use  Medications and Allergies ReviewedMEDICATIONS  (STANDING):  aspirin  chewable 81 milliGRAM(s) Oral daily  cholecalciferol 2000 Unit(s) Oral daily  enoxaparin Injectable 40 milliGRAM(s) SubCutaneous every 24 hours  escitalopram 10 milliGRAM(s) Oral daily  finasteride 5 milliGRAM(s) Oral daily  glucagon  Injectable 1 milliGRAM(s) IntraMuscular once  insulin lispro (ADMELOG) corrective regimen sliding scale   SubCutaneous three times a day before meals  insulin lispro (ADMELOG) corrective regimen sliding scale   SubCutaneous at bedtime  latanoprost 0.005% Ophthalmic Solution 1 Drop(s) Both EYES at bedtime  losartan 50 milliGRAM(s) Oral daily  metoprolol succinate ER 50 milliGRAM(s) Oral daily  rosuvastatin 10 milliGRAM(s) Oral at bedtime     ROS: Pertinent positives in HPI, all other ROS were reviewed and are negative.      Examination:   Vital Signs Last 24 Hrs  T(C): 36.3 (11 Nov 2023 08:36), Max: 38.9 (10 Nov 2023 20:32)  T(F): 97.3 (11 Nov 2023 08:36), Max: 102 (10 Nov 2023 20:32)  HR: 62 (11 Nov 2023 08:36) (48 - 64)  BP: 169/80 (11 Nov 2023 08:36) (124/78 - 169/80)  BP(mean): 102 (11 Nov 2023 08:36) (102 - 102)  RR: 18 (11 Nov 2023 08:36) (16 - 18)  SpO2: 100% (11 Nov 2023 08:36) (95% - 100%)    Parameters below as of 11 Nov 2023 08:36  Patient On (Oxygen Delivery Method): room air      General: Cooperative, NAD   NECK: supple, no masses  ENT: Normal hearing   Vascular : no carotid bruits,   Lungs: CTAB  Chest: RRR, no murmurs  Extremities: nontender, no edema  Musculoskeletal: no adventitious movements, no joint stiffness  Skin: no rash    Neurological Examination:  NIHSS:  MS: AOx3. Appropriately interactive, anxious affect. Speech fluent w/o paraphasic error, repetition, naming, reading is intact   CN: VFFTC, PERLL, EOMI, V1-3 sensation intact, face symmetric, hearing intact, palate elevates symmetrically, tongue midline, SCM equal bilaterally  Motor: normal bulk and tone, no tremor, rigidity or bradykinesia.  5/5 all over   Sens: Intact to light touch.    Reflexes: 0-1/4 all over, downgoing toes b/l  Coord:  No dysmetria, SUSANA intact   Gait: Cannot test    Labs: Reviewed  Basic Metabolic Panel in AM (11.11.23 @ 06:09)   Sodium: 139 mmol/L  Potassium: 3.9 mmol/L  Chloride: 105 mmol/L  Carbon Dioxide: 27 mmol/L  Anion Gap: 7 mmol/L  Blood Urea Nitrogen: 22 mg/dL  Creatinine: 1.05 mg/dL  Glucose: 126 mg/dL  Calcium: 8.9 mg/dL  eGFR: 70:  Complete Blood Count in AM (11.11.23 @ 06:09)   WBC Count: 8.14 K/uL  RBC Count: 4.10 M/uL  Hemoglobin: 13.1 g/dL  Hematocrit: 38.6 %  Mean Cell Volume: 94.1 fl  Mean Cell Hemoglobin: 32.0 pg  Mean Cell Hemoglobin Conc: 33.9 gm/dL  Red Cell Distrib Width: 13.2 %  Platelet Count - Automated: 171 K/uL              Imaging:   < from: CT Head No Cont (11.09.23 @ 22:46) >  FINDINGS:    There is no CT evidence of acute intracranial hemorrhage, mass effect,   midline shift, or acute, large territorial infarct.  The ventricles and   sulci are prominent compatible with age-appropriate brain parenchymal   volume loss. Patchy and confluent periventricular and subcortical white   matter hypodensities are nonspecific, although likely due to chronic   microangiopathy. The basal cisterns are patent.    There are atherosclerotic calcifications at the skull base.    There is a trace left mastoid air cell effusion, the remaining mastoid   air cells and middle ear cavities are grossly clear. There is minimal   mucosal thickening within bilateral ethmoid air cells, the remaining   visualized paranasal sinuses are clear. There are bilateral ocular lens   replacements.    The calvarium and skull base are grossly intact.    IMPRESSION:  No acute intracranial hemorrhage or mass effect.  Chronic changes, as described above.    EEG: normal awake, drowsy and briefly asleep.    < end of copied text >

## 2023-11-11 NOTE — DISCHARGE NOTE NURSING/CASE MANAGEMENT/SOCIAL WORK - PATIENT PORTAL LINK FT
You can access the FollowMyHealth Patient Portal offered by [facility] by registering at the following website: [email or web address]. By joining [employer]’s FollowMyHealth portal, you will also be able to view your health information using other applications (apps) compatible with our system.

## 2023-11-11 NOTE — DISCHARGE NOTE PROVIDER - NSDCCAREPROVSEEN_GEN_ALL_CORE_FT
Vimal Martinez (Cardiology)  Lane Hays (Neurology)  Radu Reynaga (Infectious Disease)  Long Ortiz (Medicine)  Hilton Sutherland (Medicine)  Michel Phillips (Medicine)

## 2023-11-11 NOTE — DISCHARGE NOTE PROVIDER - CARE PROVIDER_API CALL
Bret Jones  Internal Medicine  [address] [place] [address]  Phone: [phone]  Fax: [phone]  Follow Up Time: 1 week    Samuel Garcia  Neurology  [address] [facility], [address]  [place] [address]  Phone: [phone]  Fax: [phone]  Follow Up Time: 2 weeks

## 2023-11-12 RX ORDER — LOSARTAN POTASSIUM 100 MG/1
1 TABLET, FILM COATED ORAL
Qty: 30 | Refills: 0
Start: 2023-11-12

## 2023-11-14 LAB
A PHAGOCYTOPH DNA BLD QL NAA+PROBE: NEGATIVE — SIGNIFICANT CHANGE UP
A PHAGOCYTOPH DNA BLD QL NAA+PROBE: NEGATIVE — SIGNIFICANT CHANGE UP
E CHAFFEENSIS DNA BLD QL NAA+PROBE: NEGATIVE — SIGNIFICANT CHANGE UP
E CHAFFEENSIS DNA BLD QL NAA+PROBE: NEGATIVE — SIGNIFICANT CHANGE UP
E EWINGII DNA SPEC QL NAA+PROBE: NEGATIVE — SIGNIFICANT CHANGE UP
E EWINGII DNA SPEC QL NAA+PROBE: NEGATIVE — SIGNIFICANT CHANGE UP
EHRLICHIA DNA SPEC QL NAA+PROBE: NEGATIVE — SIGNIFICANT CHANGE UP
EHRLICHIA DNA SPEC QL NAA+PROBE: NEGATIVE — SIGNIFICANT CHANGE UP
R RICKETTSI AB SER-ACNC: NEGATIVE — SIGNIFICANT CHANGE UP
R RICKETTSI AB SER-ACNC: NEGATIVE — SIGNIFICANT CHANGE UP
R RICKETTSI IGM SER-ACNC: 0.26 INDEX — SIGNIFICANT CHANGE UP (ref 0–0.89)
R RICKETTSI IGM SER-ACNC: 0.26 INDEX — SIGNIFICANT CHANGE UP (ref 0–0.89)
RICK SF IGG TITR SER IF: NEGATIVE — SIGNIFICANT CHANGE UP
RICK SF IGG TITR SER IF: NEGATIVE — SIGNIFICANT CHANGE UP
RICK SF IGM TITR SER IF: 0.26 INDEX — SIGNIFICANT CHANGE UP (ref 0–0.89)
RICK SF IGM TITR SER IF: 0.26 INDEX — SIGNIFICANT CHANGE UP (ref 0–0.89)

## 2023-11-15 LAB
CULTURE RESULTS: SIGNIFICANT CHANGE UP
SPECIMEN SOURCE: SIGNIFICANT CHANGE UP
WNV IGG TITR FLD: POSITIVE
WNV IGG TITR FLD: POSITIVE
WNV IGM SPEC QL: NEGATIVE — SIGNIFICANT CHANGE UP
WNV IGM SPEC QL: NEGATIVE — SIGNIFICANT CHANGE UP

## 2023-11-16 NOTE — CDI QUERY NOTE - NSCDIOTHERTXTBX_GEN_ALL_CORE_HH
The patient was admitted with acute encephalopathy, with documentation or possible viral etiology of the encephalopathy.    Can the viral etiology be further clarified after study?    - Current West Nile infection   - Past West Nile infection   - Other (please specify)     Supporting documentation:     West Nile Virus IgG: Positive: Positive for West Nile Virus IgG Antibody. (11.11.23 @ 12:52)   West Nile Virus IgM: Negative: No detectable West Nile Virus IgM Antibody. (11.11.23 @ 12:52)      Discharge provider note 11/11/2023   Episode of fever  Unable to rule out a viral syndrome, unspecified. ESR WNL. LDH WNL. COVID19 PCR negative. RVP PCR negative. CXR clear. UA and culture negative. Blood culture negative. Appreciate input from ID. Advised observation off antibiotics and he remains stable, afebrile, requested to be discharged. Obtained Lyme AB, babesia PCR, ehrlichia PCR, WNV, RMSF PCR, EBV, CMV, CRP, all in process. Will follow but advise close follow up with Primary Care for post discharge check up.

## 2023-11-24 DIAGNOSIS — I49.3 VENTRICULAR PREMATURE DEPOLARIZATION: ICD-10-CM

## 2023-11-24 DIAGNOSIS — H91.90 UNSPECIFIED HEARING LOSS, UNSPECIFIED EAR: ICD-10-CM

## 2023-11-24 DIAGNOSIS — F41.9 ANXIETY DISORDER, UNSPECIFIED: ICD-10-CM

## 2023-11-24 DIAGNOSIS — D72.829 ELEVATED WHITE BLOOD CELL COUNT, UNSPECIFIED: ICD-10-CM

## 2023-11-24 DIAGNOSIS — N40.0 BENIGN PROSTATIC HYPERPLASIA WITHOUT LOWER URINARY TRACT SYMPTOMS: ICD-10-CM

## 2023-11-24 DIAGNOSIS — M17.12 UNILATERAL PRIMARY OSTEOARTHRITIS, LEFT KNEE: ICD-10-CM

## 2023-11-24 DIAGNOSIS — R32 UNSPECIFIED URINARY INCONTINENCE: ICD-10-CM

## 2023-11-24 DIAGNOSIS — Z91.013 ALLERGY TO SEAFOOD: ICD-10-CM

## 2023-11-24 DIAGNOSIS — Z96.659 PRESENCE OF UNSPECIFIED ARTIFICIAL KNEE JOINT: ICD-10-CM

## 2023-11-24 DIAGNOSIS — H40.9 UNSPECIFIED GLAUCOMA: ICD-10-CM

## 2023-11-24 DIAGNOSIS — E78.5 HYPERLIPIDEMIA, UNSPECIFIED: ICD-10-CM

## 2023-11-24 DIAGNOSIS — E11.40 TYPE 2 DIABETES MELLITUS WITH DIABETIC NEUROPATHY, UNSPECIFIED: ICD-10-CM

## 2023-11-24 DIAGNOSIS — Z79.84 LONG TERM (CURRENT) USE OF ORAL HYPOGLYCEMIC DRUGS: ICD-10-CM

## 2023-11-24 DIAGNOSIS — I49.1 ATRIAL PREMATURE DEPOLARIZATION: ICD-10-CM

## 2023-11-24 DIAGNOSIS — H35.30 UNSPECIFIED MACULAR DEGENERATION: ICD-10-CM

## 2023-11-24 DIAGNOSIS — I48.0 PAROXYSMAL ATRIAL FIBRILLATION: ICD-10-CM

## 2023-11-24 DIAGNOSIS — I10 ESSENTIAL (PRIMARY) HYPERTENSION: ICD-10-CM

## 2023-11-24 DIAGNOSIS — G93.41 METABOLIC ENCEPHALOPATHY: ICD-10-CM

## 2023-11-24 DIAGNOSIS — G31.84 MILD COGNITIVE IMPAIRMENT OF UNCERTAIN OR UNKNOWN ETIOLOGY: ICD-10-CM

## 2024-02-06 ENCOUNTER — OFFICE (OUTPATIENT)
Dept: URBAN - METROPOLITAN AREA CLINIC 102 | Facility: CLINIC | Age: 84
Setting detail: OPHTHALMOLOGY
End: 2024-02-06
Payer: MEDICARE

## 2024-02-06 DIAGNOSIS — H40.1131: ICD-10-CM

## 2024-02-06 DIAGNOSIS — H35.3131: ICD-10-CM

## 2024-02-06 DIAGNOSIS — Z96.1: ICD-10-CM

## 2024-02-06 DIAGNOSIS — H43.813: ICD-10-CM

## 2024-02-06 DIAGNOSIS — S00.11XA: ICD-10-CM

## 2024-02-06 DIAGNOSIS — E11.9: ICD-10-CM

## 2024-02-06 PROCEDURE — 99213 OFFICE O/P EST LOW 20 MIN: CPT | Performed by: OPHTHALMOLOGY

## 2024-02-06 PROCEDURE — 92134 CPTRZ OPH DX IMG PST SGM RTA: CPT | Performed by: OPHTHALMOLOGY

## 2024-02-06 ASSESSMENT — REFRACTION_AUTOREFRACTION
OS_SPHERE: +0.75
OS_AXIS: 111
OS_CYLINDER: -1.25
OD_AXIS: 101
OD_SPHERE: +0.25
OD_CYLINDER: -0.75

## 2024-02-06 ASSESSMENT — SPHEQUIV_DERIVED
OS_SPHEQUIV: 0.125
OD_SPHEQUIV: -0.125

## 2024-02-06 ASSESSMENT — CONFRONTATIONAL VISUAL FIELD TEST (CVF)
OS_FINDINGS: FULL
OD_FINDINGS: FULL

## 2024-02-07 ENCOUNTER — INPATIENT (INPATIENT)
Facility: HOSPITAL | Age: 84
LOS: 1 days | Discharge: HOME HEALTH | DRG: 66 | End: 2024-02-09
Attending: SPECIALIST | Admitting: SPECIALIST
Payer: MEDICARE

## 2024-02-07 VITALS — WEIGHT: 160.06 LBS

## 2024-02-07 DIAGNOSIS — E78.5 HYPERLIPIDEMIA, UNSPECIFIED: ICD-10-CM

## 2024-02-07 DIAGNOSIS — Z79.01 LONG TERM (CURRENT) USE OF ANTICOAGULANTS: ICD-10-CM

## 2024-02-07 DIAGNOSIS — Z98.890 OTHER SPECIFIED POSTPROCEDURAL STATES: Chronic | ICD-10-CM

## 2024-02-07 DIAGNOSIS — I48.0 PAROXYSMAL ATRIAL FIBRILLATION: ICD-10-CM

## 2024-02-07 DIAGNOSIS — G93.89 OTHER SPECIFIED DISORDERS OF BRAIN: ICD-10-CM

## 2024-02-07 DIAGNOSIS — Z91.013 ALLERGY TO SEAFOOD: ICD-10-CM

## 2024-02-07 DIAGNOSIS — R32 UNSPECIFIED URINARY INCONTINENCE: ICD-10-CM

## 2024-02-07 DIAGNOSIS — I10 ESSENTIAL (PRIMARY) HYPERTENSION: ICD-10-CM

## 2024-02-07 DIAGNOSIS — F41.9 ANXIETY DISORDER, UNSPECIFIED: ICD-10-CM

## 2024-02-07 DIAGNOSIS — R29.701 NIHSS SCORE 1: ICD-10-CM

## 2024-02-07 DIAGNOSIS — H35.30 UNSPECIFIED MACULAR DEGENERATION: ICD-10-CM

## 2024-02-07 DIAGNOSIS — Z79.84 LONG TERM (CURRENT) USE OF ORAL HYPOGLYCEMIC DRUGS: ICD-10-CM

## 2024-02-07 DIAGNOSIS — M16.12 UNILATERAL PRIMARY OSTEOARTHRITIS, LEFT HIP: ICD-10-CM

## 2024-02-07 DIAGNOSIS — I61.8 OTHER NONTRAUMATIC INTRACEREBRAL HEMORRHAGE: ICD-10-CM

## 2024-02-07 DIAGNOSIS — N40.1 BENIGN PROSTATIC HYPERPLASIA WITH LOWER URINARY TRACT SYMPTOMS: ICD-10-CM

## 2024-02-07 LAB
ALBUMIN SERPL ELPH-MCNC: 3.8 G/DL — SIGNIFICANT CHANGE UP (ref 3.3–5)
ALP SERPL-CCNC: 59 U/L — SIGNIFICANT CHANGE UP (ref 40–120)
ALT FLD-CCNC: 15 U/L — SIGNIFICANT CHANGE UP (ref 12–78)
ANION GAP SERPL CALC-SCNC: 2 MMOL/L — LOW (ref 5–17)
APTT BLD: 35.1 SEC — SIGNIFICANT CHANGE UP (ref 24.5–35.6)
AST SERPL-CCNC: 13 U/L — LOW (ref 15–37)
BASOPHILS # BLD AUTO: 0.03 K/UL — SIGNIFICANT CHANGE UP (ref 0–0.2)
BASOPHILS NFR BLD AUTO: 0.5 % — SIGNIFICANT CHANGE UP (ref 0–2)
BILIRUB SERPL-MCNC: 0.4 MG/DL — SIGNIFICANT CHANGE UP (ref 0.2–1.2)
BLD GP AB SCN SERPL QL: SIGNIFICANT CHANGE UP
BUN SERPL-MCNC: 19 MG/DL — SIGNIFICANT CHANGE UP (ref 7–23)
CALCIUM SERPL-MCNC: 9.8 MG/DL — SIGNIFICANT CHANGE UP (ref 8.5–10.1)
CHLORIDE SERPL-SCNC: 106 MMOL/L — SIGNIFICANT CHANGE UP (ref 96–108)
CO2 SERPL-SCNC: 31 MMOL/L — SIGNIFICANT CHANGE UP (ref 22–31)
CREAT SERPL-MCNC: 1.19 MG/DL — SIGNIFICANT CHANGE UP (ref 0.5–1.3)
EGFR: 60 ML/MIN/1.73M2 — SIGNIFICANT CHANGE UP
EOSINOPHIL # BLD AUTO: 0.08 K/UL — SIGNIFICANT CHANGE UP (ref 0–0.5)
EOSINOPHIL NFR BLD AUTO: 1.3 % — SIGNIFICANT CHANGE UP (ref 0–6)
GLUCOSE SERPL-MCNC: 197 MG/DL — HIGH (ref 70–99)
HCT VFR BLD CALC: 40.1 % — SIGNIFICANT CHANGE UP (ref 39–50)
HGB BLD-MCNC: 13.3 G/DL — SIGNIFICANT CHANGE UP (ref 13–17)
IMM GRANULOCYTES NFR BLD AUTO: 0.3 % — SIGNIFICANT CHANGE UP (ref 0–0.9)
INR BLD: 1.25 RATIO — HIGH (ref 0.85–1.18)
LYMPHOCYTES # BLD AUTO: 1.84 K/UL — SIGNIFICANT CHANGE UP (ref 1–3.3)
LYMPHOCYTES # BLD AUTO: 30.9 % — SIGNIFICANT CHANGE UP (ref 13–44)
MCHC RBC-ENTMCNC: 31.2 PG — SIGNIFICANT CHANGE UP (ref 27–34)
MCHC RBC-ENTMCNC: 33.2 GM/DL — SIGNIFICANT CHANGE UP (ref 32–36)
MCV RBC AUTO: 94.1 FL — SIGNIFICANT CHANGE UP (ref 80–100)
MONOCYTES # BLD AUTO: 0.75 K/UL — SIGNIFICANT CHANGE UP (ref 0–0.9)
MONOCYTES NFR BLD AUTO: 12.6 % — SIGNIFICANT CHANGE UP (ref 2–14)
NEUTROPHILS # BLD AUTO: 3.24 K/UL — SIGNIFICANT CHANGE UP (ref 1.8–7.4)
NEUTROPHILS NFR BLD AUTO: 54.4 % — SIGNIFICANT CHANGE UP (ref 43–77)
PLATELET # BLD AUTO: 183 K/UL — SIGNIFICANT CHANGE UP (ref 150–400)
POTASSIUM SERPL-MCNC: 4.7 MMOL/L — SIGNIFICANT CHANGE UP (ref 3.5–5.3)
POTASSIUM SERPL-SCNC: 4.7 MMOL/L — SIGNIFICANT CHANGE UP (ref 3.5–5.3)
PROT SERPL-MCNC: 7.4 GM/DL — SIGNIFICANT CHANGE UP (ref 6–8.3)
PROTHROM AB SERPL-ACNC: 14 SEC — HIGH (ref 9.5–13)
RBC # BLD: 4.26 M/UL — SIGNIFICANT CHANGE UP (ref 4.2–5.8)
RBC # FLD: 13.6 % — SIGNIFICANT CHANGE UP (ref 10.3–14.5)
SODIUM SERPL-SCNC: 139 MMOL/L — SIGNIFICANT CHANGE UP (ref 135–145)
TROPONIN I, HIGH SENSITIVITY RESULT: 18.24 NG/L — SIGNIFICANT CHANGE UP
WBC # BLD: 5.96 K/UL — SIGNIFICANT CHANGE UP (ref 3.8–10.5)
WBC # FLD AUTO: 5.96 K/UL — SIGNIFICANT CHANGE UP (ref 3.8–10.5)

## 2024-02-07 PROCEDURE — 76376 3D RENDER W/INTRP POSTPROCES: CPT | Mod: 26

## 2024-02-07 PROCEDURE — 36415 COLL VENOUS BLD VENIPUNCTURE: CPT

## 2024-02-07 PROCEDURE — 82962 GLUCOSE BLOOD TEST: CPT

## 2024-02-07 PROCEDURE — 70450 CT HEAD/BRAIN W/O DYE: CPT

## 2024-02-07 PROCEDURE — 71045 X-RAY EXAM CHEST 1 VIEW: CPT | Mod: 26

## 2024-02-07 PROCEDURE — 70553 MRI BRAIN STEM W/O & W/DYE: CPT | Mod: MF

## 2024-02-07 PROCEDURE — 85730 THROMBOPLASTIN TIME PARTIAL: CPT

## 2024-02-07 PROCEDURE — 85610 PROTHROMBIN TIME: CPT

## 2024-02-07 PROCEDURE — 83036 HEMOGLOBIN GLYCOSYLATED A1C: CPT

## 2024-02-07 PROCEDURE — G1004: CPT

## 2024-02-07 PROCEDURE — 93010 ELECTROCARDIOGRAM REPORT: CPT

## 2024-02-07 PROCEDURE — 80053 COMPREHEN METABOLIC PANEL: CPT

## 2024-02-07 PROCEDURE — 97116 GAIT TRAINING THERAPY: CPT | Mod: GP

## 2024-02-07 PROCEDURE — 85027 COMPLETE CBC AUTOMATED: CPT

## 2024-02-07 PROCEDURE — A9579: CPT

## 2024-02-07 PROCEDURE — 99291 CRITICAL CARE FIRST HOUR: CPT

## 2024-02-07 PROCEDURE — 80061 LIPID PANEL: CPT

## 2024-02-07 PROCEDURE — 70486 CT MAXILLOFACIAL W/O DYE: CPT | Mod: 26,MA

## 2024-02-07 PROCEDURE — 72125 CT NECK SPINE W/O DYE: CPT | Mod: 26,MA

## 2024-02-07 PROCEDURE — 97161 PT EVAL LOW COMPLEX 20 MIN: CPT | Mod: GP

## 2024-02-07 PROCEDURE — 70450 CT HEAD/BRAIN W/O DYE: CPT | Mod: 26,MA

## 2024-02-07 RX ORDER — METOPROLOL TARTRATE 50 MG
50 TABLET ORAL DAILY
Refills: 0 | Status: DISCONTINUED | OUTPATIENT
Start: 2024-02-07 | End: 2024-02-09

## 2024-02-07 RX ORDER — MULTIVIT-MIN/FERROUS GLUCONATE 9 MG/15 ML
1 LIQUID (ML) ORAL
Refills: 0 | DISCHARGE

## 2024-02-07 RX ORDER — CHOLECALCIFEROL (VITAMIN D3) 125 MCG
1 CAPSULE ORAL
Qty: 0 | Refills: 0 | DISCHARGE

## 2024-02-07 RX ORDER — METFORMIN HYDROCHLORIDE 850 MG/1
500 TABLET ORAL ONCE
Refills: 0 | Status: COMPLETED | OUTPATIENT
Start: 2024-02-07 | End: 2024-02-07

## 2024-02-07 RX ORDER — SODIUM CHLORIDE 9 MG/ML
3 INJECTION INTRAMUSCULAR; INTRAVENOUS; SUBCUTANEOUS ONCE
Refills: 0 | Status: COMPLETED | OUTPATIENT
Start: 2024-02-07 | End: 2024-02-07

## 2024-02-07 RX ORDER — METFORMIN HYDROCHLORIDE 850 MG/1
1 TABLET ORAL
Qty: 0 | Refills: 0 | DISCHARGE

## 2024-02-07 RX ORDER — ROSUVASTATIN CALCIUM 5 MG/1
1 TABLET ORAL
Refills: 0 | DISCHARGE

## 2024-02-07 RX ORDER — PANTOPRAZOLE SODIUM 20 MG/1
40 TABLET, DELAYED RELEASE ORAL
Refills: 0 | Status: DISCONTINUED | OUTPATIENT
Start: 2024-02-07 | End: 2024-02-09

## 2024-02-07 RX ORDER — AZELASTINE 137 UG/1
1 SPRAY, METERED NASAL
Refills: 0 | DISCHARGE

## 2024-02-07 RX ORDER — ESCITALOPRAM OXALATE 10 MG/1
1 TABLET, FILM COATED ORAL
Refills: 0 | DISCHARGE

## 2024-02-07 RX ORDER — METOPROLOL TARTRATE 50 MG
1 TABLET ORAL
Refills: 0 | DISCHARGE

## 2024-02-07 RX ORDER — ESCITALOPRAM OXALATE 10 MG/1
10 TABLET, FILM COATED ORAL DAILY
Refills: 0 | Status: DISCONTINUED | OUTPATIENT
Start: 2024-02-07 | End: 2024-02-09

## 2024-02-07 RX ORDER — FINASTERIDE 5 MG/1
1 TABLET, FILM COATED ORAL
Refills: 0 | DISCHARGE

## 2024-02-07 RX ORDER — LABETALOL HCL 100 MG
10 TABLET ORAL ONCE
Refills: 0 | Status: COMPLETED | OUTPATIENT
Start: 2024-02-07 | End: 2024-02-07

## 2024-02-07 RX ORDER — ACETAMINOPHEN 500 MG
650 TABLET ORAL ONCE
Refills: 0 | Status: DISCONTINUED | OUTPATIENT
Start: 2024-02-07 | End: 2024-02-09

## 2024-02-07 RX ORDER — LOSARTAN POTASSIUM 100 MG/1
100 TABLET, FILM COATED ORAL DAILY
Refills: 0 | Status: DISCONTINUED | OUTPATIENT
Start: 2024-02-07 | End: 2024-02-09

## 2024-02-07 RX ORDER — ATORVASTATIN CALCIUM 80 MG/1
20 TABLET, FILM COATED ORAL AT BEDTIME
Refills: 0 | Status: DISCONTINUED | OUTPATIENT
Start: 2024-02-07 | End: 2024-02-09

## 2024-02-07 RX ORDER — FLUTICASONE PROPIONATE 50 MCG
1 SPRAY, SUSPENSION NASAL
Refills: 0 | DISCHARGE

## 2024-02-07 RX ORDER — LATANOPROST 0.05 MG/ML
1 SOLUTION/ DROPS OPHTHALMIC; TOPICAL
Refills: 0 | DISCHARGE

## 2024-02-07 RX ORDER — FINASTERIDE 5 MG/1
5 TABLET, FILM COATED ORAL DAILY
Refills: 0 | Status: DISCONTINUED | OUTPATIENT
Start: 2024-02-07 | End: 2024-02-09

## 2024-02-07 RX ADMIN — Medication 30 MILLILITER(S): at 23:48

## 2024-02-07 RX ADMIN — METFORMIN HYDROCHLORIDE 500 MILLIGRAM(S): 850 TABLET ORAL at 21:23

## 2024-02-07 RX ADMIN — FINASTERIDE 5 MILLIGRAM(S): 5 TABLET, FILM COATED ORAL at 21:24

## 2024-02-07 RX ADMIN — Medication 50 MILLIGRAM(S): at 21:24

## 2024-02-07 RX ADMIN — PANTOPRAZOLE SODIUM 40 MILLIGRAM(S): 20 TABLET, DELAYED RELEASE ORAL at 23:34

## 2024-02-07 RX ADMIN — ESCITALOPRAM OXALATE 10 MILLIGRAM(S): 10 TABLET, FILM COATED ORAL at 21:24

## 2024-02-07 RX ADMIN — LOSARTAN POTASSIUM 100 MILLIGRAM(S): 100 TABLET, FILM COATED ORAL at 21:24

## 2024-02-07 RX ADMIN — Medication 10 MILLIGRAM(S): at 19:29

## 2024-02-07 RX ADMIN — ATORVASTATIN CALCIUM 20 MILLIGRAM(S): 80 TABLET, FILM COATED ORAL at 21:24

## 2024-02-07 RX ADMIN — SODIUM CHLORIDE 3 MILLILITER(S): 9 INJECTION INTRAMUSCULAR; INTRAVENOUS; SUBCUTANEOUS at 21:08

## 2024-02-07 NOTE — ED ADULT TRIAGE NOTE - CHIEF COMPLAINT QUOTE
pt ambulatory to triage s/p fall forward while sitting on edge of tub on saturday. +head strike. today patient had ct of head at [facility] today (dr. Jones) which showed a hemmoragic lesion. bruising present to L orbital region. allergic to penicillin. pmh afib on eliquis. NA ordered

## 2024-02-07 NOTE — ED PROVIDER NOTE - EYES, MLM
Clear bilaterally, pupils equal, round and reactive to light. EOMI, + R raccoon eye without orbital ridge tenderness, no facial  Clear bilaterally, pupils equal, round and reactive to light. EOMI, + R raccoon eye without orbital ridge tenderness nor step-off deformity, no facial

## 2024-02-07 NOTE — ED PROVIDER NOTE - NS_ATTENDINGSCRIBE_ED_ALL_ED
1930 Bedside shift change report given to Damien Piedra Rn (oncoming nurse) by Anton Skiff, RN (offgoing nurse). Report included the following information SBAR, Recent Results and Med Rec Status. 2020 Patient has torn off gown. Patient has also unplugged cardiac monitor and thrown on floor. 2030 Patient has allowed tech [employer] to plug cardiac monitor back in. Patient refuses medication despite RN education. 2100 Patient refuses medication despite RN education. Patient is also now refusing to speak to RN. 2230 Patient ambulated to nurses station. Cooperates to follow RN back to room. RN changed bed per patient request. RN tucked patient into bed.    2330 Patient agrees to VS check. 1685 RN hourly round. Patient states \"I do not want a bath in the morning. Do not wake me up again. \"    1289 Patient awake and in bathroom. Is agreeable to bathing self. Allows RN to obtain blood and VS.     0530 Patient has torn off gown. Patient has also unplugged cardiac monitor and thrown on floor. If not discharged today, he threatens to leave AMA. 8099 Patient allows RN dress him in gown and plug in cardiac monitor. Loudly cussing and verbally abusive. States: \"Don't ask me any more stupid questions. Either the doctors are going to come to me or I am going to walk out and start looking for them. \"    [id number] Patient at nurses station sitting with RN. A little calmer. 1519 RN guided patient back to room. Gave him a cup of coffee and blanket. He is calm now. 0730 Bedside shift change report given to Antonio Mcdermott, RN (oncoming nurse) by Damien Piedra, RN (offgoing nurse). Report included the following information SBAR. I personally performed the service described in the documentation recorded by the scribe in my presence, and it accurately and completely records my words and actions.

## 2024-02-07 NOTE — ED PROVIDER NOTE - CONSTITUTIONAL, MLM
Well appearing elderly white M, awake, alert, oriented to person, place, time/situation and in no apparent distress. normal...

## 2024-02-07 NOTE — ED ADULT NURSE NOTE - CHPI ED NUR SYMPTOMS NEG
no blurred vision/no confusion/no dizziness/no loss of consciousness/no nausea/no numbness/no vomiting/no weakness

## 2024-02-07 NOTE — ED PROVIDER NOTE - OBJECTIVE STATEMENT
85 y/o white M with PMHx of DM, HTN on Metroprolol , AF on Eliquis BIB private car per PMD referral regarding abnormal CT s/p mechanical trip and fall 2/3/24. At that time, pt was putting on his pants and lost his balance, falling forward hitting his L forehead periorbital on blunt edge of bathtub. No LOC. Pt refused medical eval on day of incident. Subsequent worsening bruising R periorbital area. Outpatient eye MD rashaun yesterday, normal exam. PCP eval today, outpatient CT head done, questionable hemorrhagic lesion. Pt referred to ED for further eval and management. Pt denies HA, vision/swallow changes, gait abnormality, focal arm/leg/face weak/numb, B/B changes, eye pain, neck, back, extremity change, nor loss of appetite. Pt has continued Eliquis use. 85 y/o white M with PMHx of DM, HTN on Metroprolol , AF on Eliquis BIB private car per PMD referral regarding abnormal CT s/p mechanical trip and fall 2/3/24. At that time, pt was putting on his pants and lost his balance, falling forward hitting his R forehead/periorbital upon blunt edge of bathtub. No LOC. Pt refused medical eval on day of incident. Subsequent worsening of bruising R periorbital area. Outpatient eye MD eval yesterday: normal exam. PCP eval today: outpatient CT head done: report questionable hemorrhagic lesion. Pt referred to ED for further eval and management. Pt denies HA, vision/swallow changes, gait abnormality, focal arm/leg/face weak/numb, B/B changes, eye pain, neck, back, extremity change, nor loss of appetite. Pt has continued Eliquis use.

## 2024-02-07 NOTE — ED PROVIDER NOTE - ATTENDING CONTRIBUTION TO CARE
IV anti-HTN medication for BP control insetting of suspected thalamic hemorrhage IV anti-HTN medication for BP control insetting of suspected thalamic hemorrhage with close hemodynamic monitoring.

## 2024-02-07 NOTE — ED ADULT NURSE NOTE - NSFALLHARMRISKINTERV_ED_ALL_ED

## 2024-02-07 NOTE — ED PROVIDER NOTE - MUSCULOSKELETAL, MLM
Spine appears normal, range of motion is not limited, no muscle or joint tenderness, MAEx4, neck nontender, supple without pain, no extremity focal swelling, deformity, tenderness Spine appears normal, range of motion is not limited, no muscle or joint tenderness, MAEx4. Neck nontender, supple without pain. No extremity focal swelling, deformity, tenderness

## 2024-02-07 NOTE — PHARMACOTHERAPY INTERVENTION NOTE - COMMENTS
Medication reconciliation completed.  Patient was unable to provide medication information, spoke to daughter Mona at bedside and they provided current medication list; confirmed with Dr. First [facility].

## 2024-02-07 NOTE — ED ADULT NURSE NOTE - BEFAST BALANCE
[facility]    [address] [facility] [address]    Phone:  [phone]    Fax:  [phone]       Thank You for choosing us for your health care visit. We are glad to serve you and happy to provide you with this summary of your visit. Please help us to ensure we have accurate records. If you find anything that needs to be changed, please let our staff know as soon as possible.          Your Demographic Information     Patient Name Sex Noa Rondon Female 1966       Ethnic Group Patient Race    Not of  or  Origin White      Your Visit Details     Date & Time Provider Department    2017 8:30 AM  ISA Fernandez MD [facility]      Your Vitals Were     BP Height Smoking Status             94/58 5' 1.5\" (1.562 m) Former Smoker         Medications Prescribed or Re-Ordered Today     traZODone (DESYREL) 50 MG tablet    Sig - Route: Take 1 tablet by mouth nightly. - Oral    Class: Normal    Pharmacy: [facility] [place] Ph #: [phone]      Your Current Medications Are        Disp Refills Start End    Misc Natural Products (ADRENAL PO)        Sig - Route: Take by mouth 2 times daily. - Oral    Class: Historical Med    Nutritional Supplements (DHEA PO)        Class: Historical Med    Route: Oral    Misc Natural Products (PROGESTERONE EX)        Sig - Route: Apply to inner forearm nightly at bedtime on days 1-14 and 5 clicks (10 clicks) on days 15-26 of menstrual cycle; break to allow bleeding to occur. - External    Class: Historical Med    MISC NATURAL PRODUCT OP        Sig - Route: Testosterone 0.5mg/gm cream    Apply to inner forearm every morning with 1 day off each week. - Ophthalmic    Class: Historical Med    Misc Natural Products (TURMERIC CURCUMIN) Cap        Class: Historical Med    Route: Oral    traZODone (DESYREL) 50 MG tablet 30 tablet 6 2017     Sig - Route: Take 1 tablet by mouth nightly. -  Oral      Discontinued Medications        Reason for Discontinue    Ascorbic Acid (VITAMIN C PO) Therapy Completed      Allergies     No Known Allergies              Patient Instructions    Add magnesium citrate(Life extension 500 mg)    Consider Trazodone    Kellen Herbs adrenal nightly restore     Continue the Progesterone 1 click daily X 1 month, then 1 click every other day X 1 month, then every 2 days, 3 days, etc    Testosterone cut back to 5, 4, 3, 2,1 day/week(1 month at a time)    Follow-up in 3 months            No

## 2024-02-07 NOTE — ED PROVIDER NOTE - ENMT, MLM
Airway patent, Nasal mucosa clear. Mouth with normal mucosa. Throat has no vesicles, no oropharyngeal exudates and uvula is midline. + b/l hearing aids, no battles Airway patent, Nasal mucosa clear. Mouth with normal mucosa. Throat has no vesicles, no oropharyngeal exudates and uvula is midline. + b/l hearing aids, no [place]

## 2024-02-07 NOTE — ED PROVIDER NOTE - PROGRESS NOTE DETAILS
BRANDEN Haynes MD:  PORTIA GRADY awbibiana of stat ED consult. BRANDEN Haynes MD:  CT head verbal report: + complex l thalamic lesion supicious for hemorr, cannot r/o neoplasm.  Advise MRI. BRANDEN Haynes MD:  PORTIA GRADY at bedside.  Ordering Iv Labetalol for high BP. BRANDEN Haynes MD:  PORTIA GRADY aware of stat ED consult. BRANDEN Haynes MD:  Admission accepted under PORTIA Carvajal to Tele. BRANDEN Haynes MD:  CT head verbal report: + complex l thalamic lesion suspicious for hemorrhage, cannot r/o neoplasm, advise MRI.

## 2024-02-07 NOTE — ED PROVIDER NOTE - CLINICAL SUMMARY MEDICAL DECISION MAKING FREE TEXT BOX
85 y/o white M with PMHx of DM, HTN on Metroprolol , AF on Eliquis BIB private car per PMD referral regarding abnormal CT s/p mechanical trip and fall 2/3/24. At that time, pt was putting on his pants and lost his balance, falling forward hitting his L forehead periorbital on blunt edge of bathtub. No LOC. Outpatient formal eye eval yesterday negative. Normal neuro exam.  Plan NA; CT head, c-spine  See progress note for CT results, labs ordered, neurosurgery aware and consulting 83 y/o white M with PMHx of DM, HTN on Metroprolol , AF on Eliquis BIB private car per PMD referral regarding abnormal CT s/p mechanical trip and fall 2/3/24. At that time, pt was putting on his pants and lost his balance, falling forward hitting his L forehead periorbital upon blunt edge of bathtub. No LOC. Outpatient formal eye eval yesterday negative. Normal neuro exam.    Plan: CT head, c-spine    See progress note for CT results, labs ordered, neurosurgery aware and consulting.  IV Labetalol for high BP.  PORTIA svce accepts admission to Telemetry.

## 2024-02-07 NOTE — H&P ADULT - ASSESSMENT
84 year old Male with PMHx of pAF on Eliquis, fell on Saturday with noted left thalamic lesion on CTH, neurologically intact. Imaging reviewed from [facility] CTH obtained around 2 PM which left thalamic lesion appears when compared to imaging done at 6 PM.     Recommend:  Inpatient admission to Neurosurgery  Medicine consultation for Hypertension and clearance for MRI tomorrow which will require anesthesia  MRI Brain with and without contrast with anesthesia   Neuro checks q 4 hours  HOB 30 degrees  Goal SBP < 150  Admission to telemetry floor  Case was discussed with Dr. Encarnacion

## 2024-02-07 NOTE — ED PROVIDER NOTE - NEUROLOGICAL, MLM
Alert and oriented, no focal deficits, no motor or sensory deficits. noral speech, CN intact, no ataxia, motor sensory intact, NIHSS = 0 Alert and oriented, no focal deficits, no motor or sensory deficits. normal speech, CNs intact, no ataxia, motor/sensory intact, NIHSS = 0

## 2024-02-07 NOTE — H&P ADULT - HISTORY OF PRESENT ILLNESS
83 year old man with DM, HTN, HLD, paroxysmal afib on Eliquis, BPH, mild cognitive impairment, had been in his usual state of health (recent hospitalization for metabolic encephalopathy in 11/2023), who was sent in by the PCP for evaluation of left thalamic lesion. The patient had a recent fall on 02/03/2024 when he was on the bathroom and leaned forward hitting the right side of his face with no LOC. Patient refused to go to the ED for evaluation at that time. The bruising around the patient's right eye had worsened and the patient was seen by Dr. Jones today who performed a CT Head. CT Head in the office revealed a left thalamic lesion which is what prompted the ED referral.    At this time, the patient denies any headaches, nausea, vomiting, denies any other trauma.    Patient was noted to have elevated BP in the ED (190's/110's) for which the patient attributes to not taking his daily metoprolol.

## 2024-02-07 NOTE — ED PROVIDER NOTE - NSICDXPASTMEDICALHX_GEN_ALL_CORE_FT
PAST MEDICAL HISTORY:  Anxiety     BPH (benign prostatic hyperplasia)     DNS (deviated nasal septum) history of. Corrected with surgery    HLD (hyperlipidemia)     [place] (hard of hearing) Bilateral hearing aids    HTN (hypertension)     Macular degeneration     Nocturia     Osteoarthritis left knee    Type II diabetes mellitus     Urinary incontinence

## 2024-02-07 NOTE — ED ADULT NURSE NOTE - CHIEF COMPLAINT QUOTE
pt ambulatory to triage s/p fall forward while sitting on edge of tub on saturday. +head strike. today patient had ct of head at [facility] today (dr. Jones) which showed a hemmoragic lesion. bruising present to L orbital region. allergic to penicillin. pmh afib on eliquis. NA ordered

## 2024-02-07 NOTE — ED ADULT NURSE NOTE - NSICDXPASTMEDICALHX_GEN_ALL_CORE_FT
PAST MEDICAL HISTORY:  Anxiety     BPH (benign prostatic hyperplasia)     DNS (deviated nasal septum) history of. Corrected with surgery    HLD (hyperlipidemia)     [place] (hard of hearing) Bilateral hearing aids    HTN (hypertension)     Macular degeneration     Nocturia     Osteoarthritis left knee    Type II diabetes mellitus     Urinary incontinence

## 2024-02-07 NOTE — ED ADULT NURSE NOTE - OBJECTIVE STATEMENT
Patient here arrives after being instructed by doctor to go to ER after receiving CT scan of head results. Patient has a left thalamus lesion bleed vs. mass. Patient denies pain, dizziness, headache. Bruising present around his right eye. Cardiac monitor in place. 20 G IV inserted into right A/C. Daughter at bedside.

## 2024-02-07 NOTE — H&P ADULT - NSHPPHYSICALEXAM_GEN_ALL_CORE
Physical Exam:  Constitutional: Awake / alert  HEENT: PERRLA, EOMI  Neck: Supple  Respiratory: Breath sounds are clear bilaterally  Gastrointestinal: Soft, NT/ND  Extremities:  no edema  Musculoskeletal: no abnormal movements  Skin: right periorbital ecchymosis    Neurological Exam:  HF: A x O x 3, appropriately interactive, normal affect, speech fluent, no aphasia or paraphasic errors. Naming /repetition intact   CN: PERRL, EOMI, VFF, facial sensation normal, no NLFD, tongue midline  Motor: No pronator drift, Strength 5/5 in all 4 ext, normal bulk and tone, no tremor, rigidity or bradykinesia  Sens: Intact to light touch  Reflexes: Symmetric and normal, downgoing toes b/l  Coord:  No FNFA, dysmetria, SUSANA intact   Gait/Balance: Cannot test

## 2024-02-07 NOTE — CHART NOTE - NSCHARTNOTEFT_GEN_A_CORE
Called by RN for elevated BP,   chart and meds reviewed   per ER current /99 HR 67   meds reviewed pt was given losartan and metoprolol at 21:24  d/w hospitalist- will start hydralazine prn if no further improvement   will continue to monitor closely, hospitalist to consult for medical mgmt Called by RN for elevated BP,   chart and meds reviewed   per ER current /99 HR 67   meds reviewed pt was given losartan and metoprolol at 21:24  d/w hospitalist- will start hydralazine prn if no further improvement   will continue to monitor closely, hospitalist to consult for medical mgmt    pt noted to have elevation in BP again into the 196/84 pt given Hydralazine 10 mg IV q 4 hrs as per the d/w the hospitalist around 1:00am   pt transferred to 3N and rpt BP noted to be in the 180's around 2:00am   pt was seen and examined, family at the bedside, pt states he is very anxious about the upcoming MRI   he is awake and alert without distress, denies any headache, dizziness, cp, sob  pt bp rechecked at 3:00am and noted to having improved to 158/73  d/w RN continue observation at this point

## 2024-02-07 NOTE — ED ADULT NURSE REASSESSMENT NOTE - NS ED NURSE REASSESS COMMENT FT1
Spoke with neurosurgery YSABEL Jordan who is aware of BP. will reassess in 1 hour if more blood pressure medication is required. Pt resting at this time.

## 2024-02-08 DIAGNOSIS — E11.9 TYPE 2 DIABETES MELLITUS WITHOUT COMPLICATIONS: ICD-10-CM

## 2024-02-08 DIAGNOSIS — I10 ESSENTIAL (PRIMARY) HYPERTENSION: ICD-10-CM

## 2024-02-08 LAB
A1C WITH ESTIMATED AVERAGE GLUCOSE RESULT: 6.8 % — HIGH (ref 4–5.6)
ALBUMIN SERPL ELPH-MCNC: 3.8 G/DL — SIGNIFICANT CHANGE UP (ref 3.3–5)
ALP SERPL-CCNC: 58 U/L — SIGNIFICANT CHANGE UP (ref 40–120)
ALT FLD-CCNC: 16 U/L — SIGNIFICANT CHANGE UP (ref 12–78)
ANION GAP SERPL CALC-SCNC: 5 MMOL/L — SIGNIFICANT CHANGE UP (ref 5–17)
APTT BLD: 32.2 SEC — SIGNIFICANT CHANGE UP (ref 24.5–35.6)
AST SERPL-CCNC: 18 U/L — SIGNIFICANT CHANGE UP (ref 15–37)
BILIRUB SERPL-MCNC: 0.5 MG/DL — SIGNIFICANT CHANGE UP (ref 0.2–1.2)
BUN SERPL-MCNC: 18 MG/DL — SIGNIFICANT CHANGE UP (ref 7–23)
CALCIUM SERPL-MCNC: 9.9 MG/DL — SIGNIFICANT CHANGE UP (ref 8.5–10.1)
CHLORIDE SERPL-SCNC: 107 MMOL/L — SIGNIFICANT CHANGE UP (ref 96–108)
CHOLEST SERPL-MCNC: 137 MG/DL — SIGNIFICANT CHANGE UP
CO2 SERPL-SCNC: 26 MMOL/L — SIGNIFICANT CHANGE UP (ref 22–31)
CREAT SERPL-MCNC: 1.13 MG/DL — SIGNIFICANT CHANGE UP (ref 0.5–1.3)
EGFR: 64 ML/MIN/1.73M2 — SIGNIFICANT CHANGE UP
ESTIMATED AVERAGE GLUCOSE: 148 MG/DL — HIGH (ref 68–114)
GLUCOSE BLDC GLUCOMTR-MCNC: 132 MG/DL — HIGH (ref 70–99)
GLUCOSE SERPL-MCNC: 154 MG/DL — HIGH (ref 70–99)
HCT VFR BLD CALC: 40.3 % — SIGNIFICANT CHANGE UP (ref 39–50)
HDLC SERPL-MCNC: 61 MG/DL — SIGNIFICANT CHANGE UP
HGB BLD-MCNC: 13.6 G/DL — SIGNIFICANT CHANGE UP (ref 13–17)
INR BLD: 1.17 RATIO — SIGNIFICANT CHANGE UP (ref 0.85–1.18)
LIPID PNL WITH DIRECT LDL SERPL: 53 MG/DL — SIGNIFICANT CHANGE UP
MCHC RBC-ENTMCNC: 31 PG — SIGNIFICANT CHANGE UP (ref 27–34)
MCHC RBC-ENTMCNC: 33.7 GM/DL — SIGNIFICANT CHANGE UP (ref 32–36)
MCV RBC AUTO: 91.8 FL — SIGNIFICANT CHANGE UP (ref 80–100)
NON HDL CHOLESTEROL: 77 MG/DL — SIGNIFICANT CHANGE UP
PLATELET # BLD AUTO: 198 K/UL — SIGNIFICANT CHANGE UP (ref 150–400)
POTASSIUM SERPL-MCNC: 3.8 MMOL/L — SIGNIFICANT CHANGE UP (ref 3.5–5.3)
POTASSIUM SERPL-SCNC: 3.8 MMOL/L — SIGNIFICANT CHANGE UP (ref 3.5–5.3)
PROT SERPL-MCNC: 7.5 GM/DL — SIGNIFICANT CHANGE UP (ref 6–8.3)
PROTHROM AB SERPL-ACNC: 13.2 SEC — HIGH (ref 9.5–13)
RBC # BLD: 4.39 M/UL — SIGNIFICANT CHANGE UP (ref 4.2–5.8)
RBC # FLD: 13.5 % — SIGNIFICANT CHANGE UP (ref 10.3–14.5)
SODIUM SERPL-SCNC: 138 MMOL/L — SIGNIFICANT CHANGE UP (ref 135–145)
TRIGL SERPL-MCNC: 134 MG/DL — SIGNIFICANT CHANGE UP
WBC # BLD: 8.54 K/UL — SIGNIFICANT CHANGE UP (ref 3.8–10.5)
WBC # FLD AUTO: 8.54 K/UL — SIGNIFICANT CHANGE UP (ref 3.8–10.5)

## 2024-02-08 PROCEDURE — 70553 MRI BRAIN STEM W/O & W/DYE: CPT | Mod: 26

## 2024-02-08 PROCEDURE — 99233 SBSQ HOSP IP/OBS HIGH 50: CPT

## 2024-02-08 PROCEDURE — 99232 SBSQ HOSP IP/OBS MODERATE 35: CPT

## 2024-02-08 PROCEDURE — 99223 1ST HOSP IP/OBS HIGH 75: CPT

## 2024-02-08 RX ORDER — GLUCAGON INJECTION, SOLUTION 0.5 MG/.1ML
1 INJECTION, SOLUTION SUBCUTANEOUS ONCE
Refills: 0 | Status: DISCONTINUED | OUTPATIENT
Start: 2024-02-08 | End: 2024-02-09

## 2024-02-08 RX ORDER — INSULIN LISPRO 100/ML
VIAL (ML) SUBCUTANEOUS
Refills: 0 | Status: DISCONTINUED | OUTPATIENT
Start: 2024-02-08 | End: 2024-02-09

## 2024-02-08 RX ORDER — DEXTROSE 50 % IN WATER 50 %
25 SYRINGE (ML) INTRAVENOUS ONCE
Refills: 0 | Status: DISCONTINUED | OUTPATIENT
Start: 2024-02-08 | End: 2024-02-09

## 2024-02-08 RX ORDER — DEXTROSE 50 % IN WATER 50 %
15 SYRINGE (ML) INTRAVENOUS ONCE
Refills: 0 | Status: DISCONTINUED | OUTPATIENT
Start: 2024-02-08 | End: 2024-02-09

## 2024-02-08 RX ORDER — SODIUM CHLORIDE 9 MG/ML
1000 INJECTION, SOLUTION INTRAVENOUS
Refills: 0 | Status: DISCONTINUED | OUTPATIENT
Start: 2024-02-08 | End: 2024-02-08

## 2024-02-08 RX ORDER — DEXTROSE 50 % IN WATER 50 %
12.5 SYRINGE (ML) INTRAVENOUS ONCE
Refills: 0 | Status: DISCONTINUED | OUTPATIENT
Start: 2024-02-08 | End: 2024-02-09

## 2024-02-08 RX ORDER — ALPRAZOLAM 0.25 MG
0.12 TABLET ORAL ONCE
Refills: 0 | Status: DISCONTINUED | OUTPATIENT
Start: 2024-02-08 | End: 2024-02-08

## 2024-02-08 RX ORDER — SODIUM CHLORIDE 9 MG/ML
1000 INJECTION, SOLUTION INTRAVENOUS
Refills: 0 | Status: DISCONTINUED | OUTPATIENT
Start: 2024-02-08 | End: 2024-02-09

## 2024-02-08 RX ORDER — INSULIN LISPRO 100/ML
VIAL (ML) SUBCUTANEOUS AT BEDTIME
Refills: 0 | Status: DISCONTINUED | OUTPATIENT
Start: 2024-02-08 | End: 2024-02-09

## 2024-02-08 RX ORDER — HYDRALAZINE HCL 50 MG
10 TABLET ORAL EVERY 4 HOURS
Refills: 0 | Status: DISCONTINUED | OUTPATIENT
Start: 2024-02-08 | End: 2024-02-09

## 2024-02-08 RX ADMIN — Medication 10 MILLIGRAM(S): at 06:41

## 2024-02-08 RX ADMIN — LOSARTAN POTASSIUM 100 MILLIGRAM(S): 100 TABLET, FILM COATED ORAL at 08:23

## 2024-02-08 RX ADMIN — ATORVASTATIN CALCIUM 20 MILLIGRAM(S): 80 TABLET, FILM COATED ORAL at 21:06

## 2024-02-08 RX ADMIN — Medication 50 MILLIGRAM(S): at 08:23

## 2024-02-08 RX ADMIN — Medication 10 MILLIGRAM(S): at 00:57

## 2024-02-08 RX ADMIN — ESCITALOPRAM OXALATE 10 MILLIGRAM(S): 10 TABLET, FILM COATED ORAL at 08:24

## 2024-02-08 RX ADMIN — FINASTERIDE 5 MILLIGRAM(S): 5 TABLET, FILM COATED ORAL at 08:24

## 2024-02-08 NOTE — CONSULT NOTE ADULT - SUBJECTIVE AND OBJECTIVE BOX
Patient seen and examined. No cp, no sob, no n/v.    Vital Signs Last 24 Hrs  T(C): 36.8 (08 Feb 2024 20:36), Max: 36.9 (08 Feb 2024 08:36)  T(F): 98.3 (08 Feb 2024 20:36), Max: 98.4 (08 Feb 2024 08:36)  HR: 72 (08 Feb 2024 20:36) (67 - 165)  BP: 137/83 (08 Feb 2024 20:36) (127/77 - 196/84)  BP(mean): 117 (08 Feb 2024 01:24) (117 - 134)  RR: 18 (08 Feb 2024 20:36) (14 - 21)  SpO2: 96% (08 Feb 2024 20:36) (96% - 100%)    Parameters below as of 08 Feb 2024 20:36  Patient On (Oxygen Delivery Method): room air    T(C): 36.8 (02-08-24 @ 20:36), Max: 36.9 (02-08-24 @ 08:36)  HR: 72 (02-08-24 @ 20:36) (67 - 165)  BP: 137/83 (02-08-24 @ 20:36) (127/77 - 196/84)  RR: 18 (02-08-24 @ 20:36) (14 - 21)  SpO2: 96% (02-08-24 @ 20:36) (96% - 100%)    CONSTITUTIONAL: no apparent distress  EYES:  EOMI, No conjunctival or scleral injection, non-icteric  ENMT: Oral mucosa with moist membranes.              NECK: Supple, symmetric and without tracheal deviation   RESP: No respiratory distress, no use of accessory muscles; CTA b/l, no WRR  CV: RRR, +S1S2, no MRG; no JVD; no peripheral edema  GI: Soft, NT, ND, no rebound       Patient seen and examined. No cp, no sob, no n/v.    Vital Signs Last 24 Hrs  T(C): 36.8 (08 Feb 2024 20:36), Max: 36.9 (08 Feb 2024 08:36)  T(F): 98.3 (08 Feb 2024 20:36), Max: 98.4 (08 Feb 2024 08:36)  HR: 72 (08 Feb 2024 20:36) (67 - 165)  BP: 137/83 (08 Feb 2024 20:36) (127/77 - 196/84)  BP(mean): 117 (08 Feb 2024 01:24) (117 - 134)  RR: 18 (08 Feb 2024 20:36) (14 - 21)  SpO2: 96% (08 Feb 2024 20:36) (96% - 100%)    Parameters below as of 08 Feb 2024 20:36  Patient On (Oxygen Delivery Method): room air    T(C): 36.8 (02-08-24 @ 20:36), Max: 36.9 (02-08-24 @ 08:36)  HR: 72 (02-08-24 @ 20:36) (67 - 165)  BP: 137/83 (02-08-24 @ 20:36) (127/77 - 196/84)  RR: 18 (02-08-24 @ 20:36) (14 - 21)  SpO2: 96% (02-08-24 @ 20:36) (96% - 100%)    CONSTITUTIONAL: no apparent distress  EYES:  EOMI, No conjunctival or scleral injection, non-icteric  ENMT: Oral mucosa with moist membranes.              NECK: Supple, symmetric and without tracheal deviation   RESP: No respiratory distress, no use of accessory muscles; CTA b/l, no WRR  CV: RRR, +S1S2, no MRG; no JVD; no peripheral edema  GI: Soft, NT, ND, no rebound          < from: MR Head w/wo IV Cont (02.08.24 @ 12:49) >  There is evidence of an area of abnormal T1 shortening seen involving the   left thalamus. This corresponds to abnormality seen on the prior head CT.   This finding does demonstrate small amount of surrounding edema and is   suspicious for an area of parenchymal hemorrhage. This finding measures   approximately 1.2 x 0.9 cm. Continued close interval follow-up is   recommended with contrast enhanced MRI as a hemorrhage would be expected   to change its characteristics in a short period of time.    < end of copied text >

## 2024-02-08 NOTE — PATIENT PROFILE ADULT - FALL HARM RISK - HARM RISK INTERVENTIONS
Assistance with ambulation/Assistance OOB with selected safe patient handling equipment/Communicate Risk of Fall with Harm to all staff/Discuss with provider need for PT consult/Monitor for mental status changes/Monitor gait and stability/Provide patient with walking aids - walker, cane, crutches/Reinforce activity limits and safety measures with patient and family/Tailored Fall Risk Interventions/Toileting schedule using arm’s reach rule for commode and bathroom/Use of alarms - bed, chair and/or voice tab/Visual Cue: Yellow wristband and red socks/Bed in lowest position, wheels locked, appropriate side rails in place/Call bell, personal items and telephone in reach/Instruct patient to call for assistance before getting out of bed or chair/Non-slip footwear when patient is out of bed/[place] to call system/Physically safe environment - no spills, clutter or unnecessary equipment/Purposeful Proactive Rounding/Room/bathroom lighting operational, light cord in reach

## 2024-02-08 NOTE — PATIENT PROFILE ADULT - NSTRANSFERBELONGINGSDISPO_GEN_A_NUR
"Subjective:       Patient ID: Bel Loredo is a 64 y.o. female.    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 Loredo returns today for follow up. She is unsure if she plans to undergo a  free fat transfer to R breast, excision of keloids bilateral breast, and possible R nipple reconstruction. She is scheduled to see Dr Brasher for discussion of 'post op XRT following keloid resection' if she indeed goes through with procedure. She has been on anastrazole since June 2017.    Briefly, she is a  64-year-old  female who recently underwent a  Right mastectomy and sentinel node biopsy for two right sided grade I carcinomas,   that were both ER positive, WV positive, and HER-2 negative,  measuring 6 and 7 mm respectively.  Resection margins were clear, while the sentinel lymph node was   negative. Right breast is s/p implant placement and left breast is s/p mastopexy.   She was started on anastrazole June 2017.   Her DXA scan 7/18/17 shows normal bone density.    Her left side mammogram today revealed a preliminary report "1) Multiple similar left breast focal asymmetries with fat density centrally, some with associated dystrophic and early/developing rim calcifications. These are favored to represent developing fat necrosis and are probably benign. BI-RADS 3: probably benign. Short Interval Follow-Up in 6 Months is recommended."       The medical as well as family history remain unchanged.    Review of Systems    Overall she feels well. She continues to have intermittent pain at keloid scar of lateral left breast which is no worse than usual.  ECOG PS is 1.  She denies any depression, easy bruising, fevers, chills, night  sweats, weight loss, nausea, vomiting, diarrhea, constipation, diplopia, blurred vision, headache, chest pain, palpitations, shortness of breath, breast pain, abdominal pain, extremity pain, or difficulty ambulating.  The remainder of the " ten-point ROS, including general, skin, lymph, H/N, cardiorespiratory, GI, , Neuro, Endocrine, and psychiatric is negative.       Objective:      Physical Exam    She is alert, oriented to time, place, person, pleasant, well      nourished, in no acute physical distress.  She si accompanied by her sistter.                                VITAL SIGNS:  Reviewed                                      HEENT:  Normal.  There are no nasal, oral, lip, gingival, auricular, lid,    or conjunctival lesions.  Mucosae are moist and pink, and there is no        thrush.  Pupils are equal, reactive to light and accommodation.              Extraocular muscle movements are intact.  Maxillary teeth are missing while the mandibular dentition is fair. There is no frontal or maxillary tenderness.                                     NECK:  Supple without JVD, adenopathy, or thyromegaly.                       LUNGS:  Clear to auscultation without wheezing, rales, or rhonchi.           CARDIOVASCULAR:  Reveals an S1, S2, no murmurs, no rubs, no gallops.         ABDOMEN:  Soft, nontender, without organomegaly.  Bowel sounds are    present.                                                                     EXTREMITIES:  No cyanosis, clubbing, or edema.                               BREASTS:  She is status post right mastectomy with a prosthesis in place.   There are no masses in her left breast.   She is s/p left reduction mammoplasty.     Her incisions are healing nicely        R Breast - incision well healed, no erythema/drainage, surgically absent nipple, keloid scar of lateral breast  L Breast - incision well healed, no erythema/drainage, nipple viable, keloid scar of lateral breast  LYMPHATIC:  There is no cervical, axillary, or supraclavicular adenopathy.   SKIN:  Warm and moist, without petechiae, rashes, induration, or ecchymoses.           NEUROLOGIC:  DTRs are 0-1+ bilaterally, symmetrical, motor function is 5/5,  and cranial  nerves are  within normal limits.      Assessment:       1. Malignant neoplasm of upper-outer quadrant of right female breast, s/p mastectomy   2.     Use of AIs  3.     Abnormal mammogram, left breast  Plan:        I had a long discussion with her.  She will remain on anastrazole through June 2022, and see Dr. Penn again in 4 months.   Her next mammogram will be due in 6 months (short interval follow up). Her next BMD will be due 7/18/19. She is to continue her current medication regimen.       Distress Screening Results: Psychosocial Distress screening score of Distress Score: 5 noted and reviewed. No intervention indicated.   with patient

## 2024-02-08 NOTE — PROGRESS NOTE ADULT - SUBJECTIVE AND OBJECTIVE BOX
Patient is a 84y old  Male who presents with a chief complaint of left thalamic lesion (08 Feb 2024 07:46)      HPI:  83 year old man with DM, HTN, HLD, paroxysmal afib on Eliquis, BPH, mild cognitive impairment, had been in his usual state of health (recent hospitalization for metabolic encephalopathy in 11/2023), who was sent in by the PCP for evaluation of left thalamic lesion. The patient had a recent fall on 02/03/2024 when he was on the bathroom and leaned forward hitting the right side of his face with no LOC. Patient refused to go to the ED for evaluation at that time. The bruising around the patient's right eye had worsened and the patient was seen by Dr. Jones today who performed a CT Head. CT Head in the office revealed a left thalamic lesion which is what prompted the ED referral.    At this time, the patient denies any headaches, nausea, vomiting, denies any other trauma.    Patient was noted to have elevated BP in the ED (190's/110's) for which the patient attributes to not taking his daily metoprolol.  (07 Feb 2024 19:35)    2/8: Patient was seen and examined at bedside with no acute complaints at this time. Denies any headaches, nausea, vomiting or weakness. Patient has been NPO after MN for MRI Brain with and without contrast with anesthesia.       acetaminophen     Tablet .. 650 milliGRAM(s) Oral once PRN  atorvastatin 20 milliGRAM(s) Oral at bedtime  escitalopram 10 milliGRAM(s) Oral daily  finasteride 5 milliGRAM(s) Oral daily  hydrALAZINE Injectable 10 milliGRAM(s) IV Push every 4 hours PRN  losartan 100 milliGRAM(s) Oral daily  metoprolol succinate ER 50 milliGRAM(s) Oral daily  pantoprazole    Tablet 40 milliGRAM(s) Oral before breakfast PRN      Vital Signs Last 24 Hrs  T(C): 36.7 (08 Feb 2024 02:45), Max: 36.8 (07 Feb 2024 18:47)  T(F): 98.1 (08 Feb 2024 02:45), Max: 98.3 (07 Feb 2024 18:47)  HR: 77 (08 Feb 2024 06:33) (66 - 165)  BP: 185/85 (08 Feb 2024 06:33) (158/73 - 208/121)  BP(mean): 117 (08 Feb 2024 01:24) (109 - 140)  RR: 18 (08 Feb 2024 02:45) (14 - 21)  SpO2: 100% (08 Feb 2024 02:45) (98% - 100%)    Parameters below as of 08 Feb 2024 02:45  Patient On (Oxygen Delivery Method): room air                          13.3   5.96  )-----------( 183      ( 07 Feb 2024 18:51 )             40.1       02-07    139  |  106  |  19  ----------------------------<  197<H>  4.7   |  31  |  1.19    Ca    9.8      07 Feb 2024 18:51    TPro  7.4  /  Alb  3.8  /  TBili  0.4  /  DBili  x   /  AST  13<L>  /  ALT  15  /  AlkPhos  59  02-07      PT/INR - ( 07 Feb 2024 18:51 )   PT: 14.0 sec;   INR: 1.25 ratio         PTT - ( 07 Feb 2024 18:51 )  PTT:35.1 sec      Physical Exam:  Constitutional: Awake / alert  HEENT: PERRLA, EOMI  Neck: Supple  Respiratory: Breath sounds are clear bilaterally  Gastrointestinal: Soft, NT/ND  Extremities:  no edema  Musculoskeletal: no abnormal movements  Skin: right periorbital ecchymosis    Neurological Exam:  HF: A x O x 3, appropriately interactive, normal affect, speech fluent, no aphasia or paraphasic errors. Naming /repetition intact   CN: PERRL, EOMI, VFF, facial sensation normal, no NLFD, tongue midline  Motor: No pronator drift, Strength 5/5 in all 4 ext, normal bulk and tone, no tremor, rigidity or bradykinesia  Sens: Intact to light touch  Reflexes: Symmetric and normal, downgoing toes b/l  Coord:  No FNFA, dysmetria, SUSANA intact   Gait/Balance: Cannot test

## 2024-02-08 NOTE — CHART NOTE - NSCHARTNOTEFT_GEN_A_CORE
Patient is low risk for MRI with anesthesia. RCRI Class 1 Risk with 0 points, 3.9% 30 day risk of death, MI or cardiac arrest

## 2024-02-08 NOTE — CONSULT NOTE ADULT - ASSESSMENT
83 year old M with PMHx of DM, HTN, HLD, PAF - on Eliquis, was in his usual state of health, reports had a fall on 02/03/2024 when he was in the bathroom, he leaned forward hitting the right side of his face, reported no LOC, refused to go to the ED at that time, sustained bruising around right eye, went to see his PMD, Dr. Jones, CT Head done revealed a left thalamic lesion, hence referred to ED on 2/7.24. In ED, BP was 190's/110's,  CT head revealed 1.5 cm complex centrally cystic peripherally hyperattenuating left thalamic lesion which may represent a centrally necrotic hemorrhagic or hypercellular neoplasm. Pt seen by neurosurgery, MRI brain w/wo [place] revealed left thalamic 1.2 X 0.9 cm lesion with slight surrounding edema most likely parenchymal hemorrhage.     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    # Uncontrolled hypertension, now normalized    – Patient was requesting to be sent home, his gait is slightly unstable, have recommended observation overnight, to be seen by PT in a.m., if stable then DC home  – Patient may resume his diet  – Blood pressure to be maintained in optimal range, SBP <150  - Patient will need follow-up MRI brain as recommended by radiology    Above management discussed with patient, his daughter and with SERGIO Littlejohn, neurosurgery PA      
83 year old man with DM, HTN, HLD, paroxysmal afib on Eliquis, BPH, mild cognitive impairment, had been in his usual state of health (recent hospitalization for metabolic encephalopathy in 11/2023)

## 2024-02-08 NOTE — CONSULT NOTE ADULT - SUBJECTIVE AND OBJECTIVE BOX
cc: 84 y old  Male who presents with a chief complaint of left thalamic lesion       HPI:  83 year old M with PMHx of DM, HTN, HLD, PAF - on Eliquis, mild cognitive impairment, was in his usual state of health, was sent in by the PCP on 2/7/24 for evaluation of left thalamic lesion. The patient had a fall on 02/03/2024 when he was in the bathroom, he leaned forward hitting the right side of his face, reported no LOC, refused to go to the ED at that time. He sustained bruising around right eye, it worsened, hence he went to see his PMD, Dr. Jones, CT Head done in the office revealed a left thalamic lesion, hence referred to ED.    In ED, BP was 190's/110's, patient attributed to not taking his daily metoprolol. CT head revealed 1.5 cm complex centrally cystic peripherally hyperattenuating left thalamic lesion which may represent a centrally necrotic hemorrhagic or hypercellular neoplasm. Pt seen by neurosurgery, MRI brain w/wo lakeisha done toady revealed left thalamic 1.2 X 0.9 cm lesion with slight surrounding edema most likely parenchymal hemorrhage.     Neurology consulted today to clear him for discharge home, as patient has been insisting he wants to go home.    On exam today patient sitting in bed, has no complaints, denies headaches, nausea, vomiting or weakness. BP is normal    PAST MEDICAL & SURGICAL HISTORY:  HTN (hypertension)  HLD (hyperlipidemia)  Type II diabetes mellitus  Macular degeneration  Anxiety  Urinary incontinence  BPH (benign prostatic hyperplasia)  Nocturia  DNS (deviated nasal septum) history of. Corrected with surgery  [place] (hard of hearing) Bilateral hearing aids  Osteoarthritis left knee  History of nasal septoplasty due to deviated septum  S/P hernia repair  H/O laser photocoagulation of retina  History of prostate surgery [facility] 4/ 2019        FAMILY HISTORY:  Family hx of hypertension (Mother)        Social Hx:  Nonsmoker, no drug or alcohol use      MEDICATIONS  (STANDING):  atorvastatin 20 milliGRAM(s) Oral at bedtime  escitalopram 10 milliGRAM(s) Oral daily  finasteride 5 milliGRAM(s) Oral daily  losartan 100 milliGRAM(s) Oral daily  metoprolol succinate ER 50 milliGRAM(s) Oral daily       Allergies    shellfish (Unknown)  No Known Drug Allergies      ROS: Pertinent positives in HPI, all other ROS were reviewed and are negative.        Vital Signs Last 24 Hrs  T(C): 36.6 (08 Feb 2024 12:55), Max: 36.9 (08 Feb 2024 08:36)  T(F): 97.9 (08 Feb 2024 12:55), Max: 98.4 (08 Feb 2024 08:36)  HR: 75 (08 Feb 2024 13:20) (66 - 165)  BP: 127/77 (08 Feb 2024 13:20) (127/77 - 208/121)  BP(mean): 117 (08 Feb 2024 01:24) (109 - 140)  RR: 16 (08 Feb 2024 13:20) (14 - 21)  SpO2: 97% (08 Feb 2024 13:20) (96% - 100%)    Parameters below as of 08 Feb 2024 12:55  Patient On (Oxygen Delivery Method): room air    Physical Exam:  Constitutional: Awake / alert  HEENT: PERRLA, EOMI, Right vincent-orbital hematoma  Neck: Supple  Respiratory: Breath sounds are clear bilaterally  Extremities:  no edema  Musculoskeletal: no abnormal movements  Skin: right periorbital ecchymosis      Neurological exam:  HF: A x O x 3-. interactive but irritable, Speech fluent, No Aphasia. Naming /repetition intact   CN: PACO, EOMI, VFF, facial sensation normal, no NLFD, tongue midline, Palate moves equally, SCM equal bilaterally  Motor: No pronator drift, Strength 5/5 in all 4 ext, normal bulk and tone, no tremor  Sens: Intact to light touch / PP/ VS/ JS    Reflexes: Symmetric and normal, downgoing toes b/l  Coord:  No FNFA, dysmetria, SUSANA intact   Gait/Balance: Unstable, mild ataxia    NIHSS: 1          Labs:   02-08    138  |  107  |  18  ----------------------------<  154<H>  3.8   |  26  |  1.13    Ca    9.9      08 Feb 2024 07:52    TPro  7.5  /  Alb  3.8  /  TBili  0.5  /  DBili  x   /  AST  18  /  ALT  16  /  AlkPhos  58  02-08 02-08 Chol 137 LDL -- HDL 61 Trig 134               < from: MR Head w/wo IV Cont (02.08.24 @ 12:49) >  There is evidence of an area of abnormal T1 shortening seen involving the   left thalamus. This corresponds to abnormality seen on the prior head CT.   This finding does demonstrate small amount of surrounding edema and is   suspicious for an area of parenchymal hemorrhage. This finding measures   approximately 1.2 x 0.9 cm. Continued close interval follow-up is   recommended with contrast enhanced MRI as a hemorrhage would be expected   to change its characteristics in a short period of time.    Extensive parenchymal volume loss and chronic microvessel ischemic   changes are identified    There is no acute hemorrhage mass effect seen.    Evaluation of the diffusion weighted sequence demonstrates no abnormal   areas of restricted diffusion to suggest acute infarct.    The large vessels demonstrate normal flow voids    Both mastoid and middle ear regions appear clear.    IMPRESSION: Parenchymal hemorrhage identified described above.    -< from: CT Head No Cont (02.07.24 @ 17:44) >  CT HEAD: There is a new 1.5 cm complex centrally cystic peripherally   hyperattenuating left thalamic lesion which may represent a centrally   necrotic hemorrhagic or hypercellular neoplasm. Recommend further   evaluation with contrast-enhanced MRI of the brain. No significant mass   effect. No depressed calvarial fracture.      Notification to clinician of alert:  Dr. Huy Haynes was notified about the left thalamic lesion at   6:46 PM on 2/7/2024 with read back confirmation. The opportunity for   questions was provided and all questions asked were answered.    CT CERVICAL SPINE: No fracture or acute traumatic malalignment.   Multilevel cervical spondylosis as described.    CT maxillofacial: No facial bone fracture.

## 2024-02-09 ENCOUNTER — TRANSCRIPTION ENCOUNTER (OUTPATIENT)
Age: 84
End: 2024-02-09

## 2024-02-09 VITALS
SYSTOLIC BLOOD PRESSURE: 147 MMHG | DIASTOLIC BLOOD PRESSURE: 73 MMHG | HEART RATE: 81 BPM | TEMPERATURE: 98 F | RESPIRATION RATE: 18 BRPM | OXYGEN SATURATION: 97 %

## 2024-02-09 LAB — GLUCOSE BLDC GLUCOMTR-MCNC: 147 MG/DL — HIGH (ref 70–99)

## 2024-02-09 PROCEDURE — 99232 SBSQ HOSP IP/OBS MODERATE 35: CPT

## 2024-02-09 PROCEDURE — 70450 CT HEAD/BRAIN W/O DYE: CPT | Mod: 26

## 2024-02-09 RX ORDER — APIXABAN 2.5 MG/1
1 TABLET, FILM COATED ORAL
Refills: 0 | DISCHARGE

## 2024-02-09 RX ADMIN — FINASTERIDE 5 MILLIGRAM(S): 5 TABLET, FILM COATED ORAL at 09:13

## 2024-02-09 RX ADMIN — Medication 10 MILLIGRAM(S): at 06:40

## 2024-02-09 RX ADMIN — Medication 50 MILLIGRAM(S): at 09:13

## 2024-02-09 RX ADMIN — LOSARTAN POTASSIUM 100 MILLIGRAM(S): 100 TABLET, FILM COATED ORAL at 09:13

## 2024-02-09 RX ADMIN — ESCITALOPRAM OXALATE 10 MILLIGRAM(S): 10 TABLET, FILM COATED ORAL at 09:14

## 2024-02-09 NOTE — DISCHARGE NOTE PROVIDER - NSDCMRMEDTOKEN_GEN_ALL_CORE_FT
azelastine 137 mcg/inh (0.1%) nasal spray: 1 spray(s) in each nostril 2 times a day as needed for  allergy symptoms  Eliquis 5 mg oral tablet: 1 tab(s) orally 2 times a day  escitalopram 10 mg oral tablet: 1 tab(s) orally once a day  finasteride 5 mg oral tablet: 1 tab(s) orally once a day (in the evening)  Flonase 50 mcg/inh nasal spray: 1 spray(s) nasal once a day as needed for  allergy symptoms  latanoprost 0.005% ophthalmic solution: 1 drop(s) in each eye once a day (in the evening)  losartan 100 mg oral tablet: 1 tab(s) orally once a day  metFORMIN 500 mg oral tablet, extended release: 1 tab(s) orally 2 times a day  metoprolol succinate 25 mg oral tablet, extended release: 1 tab(s) orally once a day (at bedtime)  pantoprazole 40 mg oral delayed release tablet: 1 tab(s) orally 2 times a day as needed for  indigestion  PreserVision AREDS 2 oral capsule: 1 cap(s) orally 2 times a day  rosuvastatin 10 mg oral tablet: 1 tab(s) orally once a day (at bedtime)  Vitamin D3 50 mcg (2000 intl units) oral tablet: 1 tab(s) orally once a day (in the morning)   azelastine 137 mcg/inh (0.1%) nasal spray: 1 spray(s) in each nostril 2 times a day as needed for  allergy symptoms  escitalopram 10 mg oral tablet: 1 tab(s) orally once a day  finasteride 5 mg oral tablet: 1 tab(s) orally once a day (in the evening)  Flonase 50 mcg/inh nasal spray: 1 spray(s) nasal once a day as needed for  allergy symptoms  latanoprost 0.005% ophthalmic solution: 1 drop(s) in each eye once a day (in the evening)  losartan 100 mg oral tablet: 1 tab(s) orally once a day  metFORMIN 500 mg oral tablet, extended release: 1 tab(s) orally 2 times a day  metoprolol succinate 25 mg oral tablet, extended release: 1 tab(s) orally once a day (at bedtime)  pantoprazole 40 mg oral delayed release tablet: 1 tab(s) orally 2 times a day as needed for  indigestion  PreserVision AREDS 2 oral capsule: 1 cap(s) orally 2 times a day  rosuvastatin 10 mg oral tablet: 1 tab(s) orally once a day (at bedtime)  Vitamin D3 50 mcg (2000 intl units) oral tablet: 1 tab(s) orally once a day (in the morning)

## 2024-02-09 NOTE — DISCHARGE NOTE NURSING/CASE MANAGEMENT/SOCIAL WORK - NSDCPEFALRISK_GEN_ALL_CORE
For information on Fall & Injury Prevention, visit: [email or web address] OR  [email or web address] OR  https://www.cdc.gov/steadi/patient.html

## 2024-02-09 NOTE — PHYSICAL THERAPY INITIAL EVALUATION ADULT - DIAGNOSIS, PT EVAL
Left Thalamic hemorrhage > mass lesion, minimal residual deficits - mild unstable gait. Uncontrolled hypertension,

## 2024-02-09 NOTE — DISCHARGE NOTE PROVIDER - HOSPITAL COURSE
83 year old man with DM, HTN, HLD, paroxysmal afib on Eliquis, BPH, mild cognitive impairment, had been in his usual state of health (recent hospitalization for metabolic encephalopathy in 11/2023), who was sent in by the PCP for evaluation of left thalamic lesion. The patient had a recent fall on 02/03/2024 when he was on the bathroom and leaned forward hitting the right side of his face with no LOC. Patient refused to go to the ED for evaluation at that time. The bruising around the patient's right eye had worsened and the patient was seen by Dr. Jones today who performed a CT Head. CT Head in the office revealed a left thalamic lesion which is what prompted the ED referral.    At this time, the patient denies any headaches, nausea, vomiting, denies any other trauma.    Patient was noted to have elevated BP in the ED (190's/110's) for which the patient attributes to not taking his daily metoprolol.  (07 Feb 2024 19:35)    2/8: Patient was seen and examined at bedside with no acute complaints at this time. Denies any headaches, nausea, vomiting or weakness. Patient has been NPO after MN for MRI Brain with and without contrast with anesthesia.     2/9: MRI Brain +/- revealed underlying hemorrhage. Patient was seen and examined by neurology and physical therapy. Patient planned for discharge home today with outpatient follow up with Dr. Encarnacion for repeat CTH in 2 weeks    Patient is to continue holding Eliquis x 2 weeks until outpatient follow up with Dr. Encarnacion. OK to resume if CTH in 2 weeks reveals no new hemorrhage.

## 2024-02-09 NOTE — PROGRESS NOTE ADULT - SUBJECTIVE AND OBJECTIVE BOX
< from: CT Head No Cont (02.09.24 @ 10:19) >  IMPRESSION:   Moderate periventricular white matter ischemia. Unchanged   evolving 1.6 cm hemorrhagic infarction in the LEFT thalamus.    --- End of Report ---    < end of copied text >   Patient seen and examined sitting in hallway. Eager to go home. No headache, no dizziness or lightheadedness.     Vital Signs Last 24 Hrs  T(C): 36.6 (09 Feb 2024 08:34), Max: 36.8 (08 Feb 2024 20:36)  T(F): 97.9 (09 Feb 2024 08:34), Max: 98.3 (08 Feb 2024 20:36)  HR: 72 (09 Feb 2024 08:34) (72 - 72)  BP: 140/73 (09 Feb 2024 08:34) (137/83 - 166/90)  BP(mean): --  RR: 18 (09 Feb 2024 08:34) (18 - 18)  SpO2: 94% (09 Feb 2024 08:34) (94% - 97%)    Parameters below as of 09 Feb 2024 08:34  Patient On (Oxygen Delivery Method): room air      CONSTITUTIONAL: no apparent distress  EYES:  EOMI, No conjunctival or scleral injection, non-icteric  ENMT: Oral mucosa with moist membranes.              NECK: Supple, symmetric and without tracheal deviation   RESP: No respiratory distress, no use of accessory muscles; CTA b/l, no WRR  CV: RRR, +S1S2, no MRG; no JVD; no peripheral edema  GI: Soft, NT, ND, no rebound      < from: CT Head No Cont (02.09.24 @ 10:19) >  IMPRESSION:   Moderate periventricular white matter ischemia. Unchanged   evolving 1.6 cm hemorrhagic infarction in the LEFT thalamus.    --- End of Report ---    < end of copied text >

## 2024-02-09 NOTE — DISCHARGE NOTE NURSING/CASE MANAGEMENT/SOCIAL WORK - PATIENT PORTAL LINK FT
You can access the FollowMyHealth Patient Portal offered by [facility] by registering at the following website: [email or web address]. By joining [employer]’s FollowMyHealth portal, you will also be able to view your health information using other applications (apps) compatible with our system.

## 2024-02-09 NOTE — PHYSICAL THERAPY INITIAL EVALUATION ADULT - PERTINENT HX OF CURRENT PROBLEM, REHAB EVAL
83 year old M with PMHx of DM, HTN, HLD, PAF - on Eliquis, mild cognitive impairment, was in his usual state of health, was sent in by the PCP on 2/7/24 for evaluation of left thalamic lesion. The patient had a fall on 02/03/2024 when he was in the bathroom, he leaned forward hitting the right side of his face, reported no LOC, refused to go to the ED at that time. He sustained bruising around right eye, it worsened, hence he went to see his PMD, Dr. Jones, CT Head done in the office revealed a left thalamic lesion, hence referred to ED.  In ED, BP was 190's/110's, patient attributed to not taking his daily metoprolol. CT head revealed 1.5 cm complex centrally cystic peripherally hyperattenuating left thalamic lesion which may represent a centrally necrotic hemorrhagic or hypercellular neoplasm. Pt seen by neurosurgery, MRI brain w/wo lakeisha done toady revealed left thalamic 1.2 X 0.9 cm lesion with slight surrounding edema most likely parenchymal hemorrhage.

## 2024-02-09 NOTE — PHYSICAL THERAPY INITIAL EVALUATION ADULT - GENERAL OBSERVATIONS, REHAB EVAL
Pt was found sitting in chair in hallaway near [facility] station, tele on, pt eating breakfast, Rt eye ecchymosis, Pt is pleasantly confused, willing to participate in PT, no c/o

## 2024-02-09 NOTE — PROGRESS NOTE ADULT - PROBLEM SELECTOR PLAN 1
- Repeat Head CT today - Repeat Head CT today - parenchymal bleed stable  - Discussed with Dr. Ga - Neurology  - Discussed with Freya Chen - Neuro Surg PA  - Repeat MRI in 2-3 weeks

## 2024-02-09 NOTE — PROGRESS NOTE ADULT - ASSESSMENT
84 year old Male with past medical history of pAF on Eliquis who presents from MD office with CTH finding of left thalamic lesion. Patient also noted to have facial trauma on 2/3. Neurologically intact.     Recommendation:  Continue NPO MN with medicines for MRI with anesthesia  Continue to hold Eliquis at this time  Maintain SBP < 160, Hydralazine PRN as per medicine  Continue home regimen  Neuro checks q 4 hours on floor  On telemetry floor  Further plans pending MRI   Case d/w Dr. Encarnacion
83 year old man with DM, HTN, HLD, paroxysmal afib on Eliquis, BPH, mild cognitive impairment, had been in his usual state of health (recent hospitalization for metabolic encephalopathy in 11/2023)    Total time spent: 35 minutes  Disposition: DC home

## 2024-02-09 NOTE — DISCHARGE NOTE PROVIDER - CARE PROVIDER_API CALL
Fritz Encarnacion  Neurosurgery  [address] [place] [id number]  Phone: [phone]  Fax: [phone]  Follow Up Time: 2 weeks

## 2024-02-09 NOTE — DISCHARGE NOTE PROVIDER - NSDCFUADDINST_GEN_ALL_CORE_FT
Continue to hold Eliquis for 2 weeks. You should have a CT Head done during follow up with Dr. Encarnacion to evaluate resolution of hematoma.

## 2024-02-14 DIAGNOSIS — S06.36AA TRAUMATIC HEMORRHAGE OF CEREBRUM, UNSPECIFIED, WITH LOSS OF CONSCIOUSNESS STATUS UNKNOWN, INITIAL ENCOUNTER: ICD-10-CM

## 2024-02-19 ENCOUNTER — APPOINTMENT (OUTPATIENT)
Dept: CT IMAGING | Facility: CLINIC | Age: 84
End: 2024-02-19
Payer: MEDICARE

## 2024-02-19 ENCOUNTER — OUTPATIENT (OUTPATIENT)
Dept: OUTPATIENT SERVICES | Facility: HOSPITAL | Age: 84
LOS: 1 days | End: 2024-02-19
Payer: MEDICARE

## 2024-02-19 DIAGNOSIS — Z98.890 OTHER SPECIFIED POSTPROCEDURAL STATES: Chronic | ICD-10-CM

## 2024-02-19 DIAGNOSIS — I61.0 NONTRAUMATIC INTRACEREBRAL HEMORRHAGE IN HEMISPHERE, SUBCORTICAL: ICD-10-CM

## 2024-02-19 PROCEDURE — 70450 CT HEAD/BRAIN W/O DYE: CPT

## 2024-02-19 PROCEDURE — 70450 CT HEAD/BRAIN W/O DYE: CPT | Mod: 26,MH

## 2024-02-21 ENCOUNTER — APPOINTMENT (OUTPATIENT)
Dept: NEUROSURGERY | Facility: CLINIC | Age: 84
End: 2024-02-21
Payer: MEDICARE

## 2024-02-21 VITALS
BODY MASS INDEX: 27.32 KG/M2 | OXYGEN SATURATION: 95 % | DIASTOLIC BLOOD PRESSURE: 100 MMHG | SYSTOLIC BLOOD PRESSURE: 177 MMHG | WEIGHT: 170 LBS | HEART RATE: 56 BPM | HEIGHT: 66 IN

## 2024-02-21 DIAGNOSIS — I61.0 NONTRAUMATIC INTRACEREBRAL HEMORRHAGE IN HEMISPHERE, SUBCORTICAL: ICD-10-CM

## 2024-02-21 PROCEDURE — 99214 OFFICE O/P EST MOD 30 MIN: CPT

## 2024-02-27 PROBLEM — I61.0 THALAMIC HEMORRHAGE: Status: ACTIVE | Noted: 2024-02-15

## 2024-02-27 NOTE — CONSULT LETTER
[FreeTextEntry2] : Bret [address]  [place] [address] [FreeTextEntry1] : Kale Quintana is a 84-year-old male who presents today for follow-up to a  bleed.  On 2/3/2024 patient had leaned forward and struck his head on the bathtub.  He reports losing balance.  Patient had underwent imaging which revealed a left thalamic hemorrhage.  No neurosurgery was performed.  Patient on Eliquis for paroxysmal A-fib at the time.  As per family recently had an ablation which resolved his a flutter and A-fib.  At this time patient denies any symptoms.  He denies any nausea or vomiting, headache, vision changes, weakness, or balance difficulties.  He denies any walking difficulties, dizziness, or memory changes.  Denies any sleep difficulties or confusion.  Patient underwent CT of the brain performed on 3/19/2024.  Report indicates "No change since 2/9/2024. Late subacute to chronic hemorrhage left thalamus without significant change."  Patient is alert and oriented.  Negative pronator drift.  Negative Romberg's.  Strength to bilateral upper and lower extremities 5/5.  Cranial nerves are intact.  No walking difficulties noted.  Finger-to-nose testing intact.  EOMI.  At this time we will follow-up in approximately 5 to 6 weeks with an updated MRI with and without contrast for further evaluation of the hemorrhage.  Patient requires IV sedation.  Patient would like to have MRI completed at [facility].  Patient may resume Eliquis.  Discussed risk versus benefits of resuming Eliquis.  Patient will follow-up in office to review images with neurosurgeon.  Patient and family aware to call with any further questions or concerns or with any new or worsening symptoms.  Discussed case and plan with Dr. Encarnacion. [FreeTextEntry3] : Brigette Torres, MSN, [facility] Department of Neurosurgery  [address], [address] [place] [address] Office: [phone] Fax: [phone]

## 2024-03-12 NOTE — CHART NOTE - NSCHARTNOTEFT_GEN_A_CORE
NEUROSURGERY ATTENDING - CDI CLARIFICATION    based on review of all imaging modalities MRI and CT and comparison to images in fall of 2023 , the presenting left thalamic lesion is most consistent with -  a hypertensive intracerebral hemorrhagic stroke. non traumatic.

## 2024-04-03 ENCOUNTER — APPOINTMENT (OUTPATIENT)
Dept: NEUROSURGERY | Facility: CLINIC | Age: 84
End: 2024-04-03

## 2024-06-11 ENCOUNTER — OFFICE (OUTPATIENT)
Dept: URBAN - METROPOLITAN AREA CLINIC 102 | Facility: CLINIC | Age: 84
Setting detail: OPHTHALMOLOGY
End: 2024-06-11
Payer: MEDICARE

## 2024-06-11 DIAGNOSIS — H35.3131: ICD-10-CM

## 2024-06-11 DIAGNOSIS — Z96.1: ICD-10-CM

## 2024-06-11 DIAGNOSIS — H43.813: ICD-10-CM

## 2024-06-11 DIAGNOSIS — S00.11XA: ICD-10-CM

## 2024-06-11 DIAGNOSIS — H40.1131: ICD-10-CM

## 2024-06-11 DIAGNOSIS — E11.9: ICD-10-CM

## 2024-06-11 PROCEDURE — 92014 COMPRE OPH EXAM EST PT 1/>: CPT | Performed by: OPHTHALMOLOGY

## 2024-06-11 PROCEDURE — 92134 CPTRZ OPH DX IMG PST SGM RTA: CPT | Performed by: OPHTHALMOLOGY

## 2024-06-11 ASSESSMENT — CONFRONTATIONAL VISUAL FIELD TEST (CVF)
OS_FINDINGS: FULL
OD_FINDINGS: FULL

## 2024-10-02 ENCOUNTER — OFFICE (OUTPATIENT)
Dept: URBAN - METROPOLITAN AREA CLINIC 12 | Facility: CLINIC | Age: 84
Setting detail: OPHTHALMOLOGY
End: 2024-10-02
Payer: MEDICARE

## 2024-10-02 DIAGNOSIS — H00.11: ICD-10-CM

## 2024-10-02 PROCEDURE — 99213 OFFICE O/P EST LOW 20 MIN: CPT | Performed by: STUDENT IN AN ORGANIZED HEALTH CARE EDUCATION/TRAINING PROGRAM

## 2024-10-02 ASSESSMENT — KERATOMETRY
OS_K2POWER_DIOPTERS: 42.00
OS_K1POWER_DIOPTERS: 41.00
METHOD_AUTO_MANUAL: AUTO
OD_AXISANGLE_DEGREES: 093
OS_AXISANGLE_DEGREES: 022
OD_K1POWER_DIOPTERS: 41.50
OD_K2POWER_DIOPTERS: 42.25

## 2024-10-02 ASSESSMENT — REFRACTION_AUTOREFRACTION
OS_AXIS: 104
OD_SPHERE: +0.25
OD_CYLINDER: -0.75
OS_CYLINDER: -1.25
OD_AXIS: 098
OS_SPHERE: +1.00

## 2024-10-02 ASSESSMENT — LID EXAM ASSESSMENTS
OS_COMMENTS: NO FB&APOS
OD_COMMENTS: NO FB&APOS
OS_MEIBOMITIS: LUL 3+
OS_COMMENTS: S
OD_MEIBOMITIS: RUL 3+
OD_COMMENTS: S

## 2024-10-02 ASSESSMENT — VISUAL ACUITY
OS_BCVA: 20/50-2
OD_BCVA: 20/40+2

## 2024-10-02 ASSESSMENT — PACHYMETRY
OS_CT_CORRECTION: -1
OD_CT_CORRECTION: -1
OS_CT_UM: 553
OD_CT_UM: 556

## 2024-10-02 ASSESSMENT — TONOMETRY
OD_IOP_MMHG: 15
OS_IOP_MMHG: 15

## 2024-10-02 ASSESSMENT — CONFRONTATIONAL VISUAL FIELD TEST (CVF)
OD_FINDINGS: FULL
OS_FINDINGS: FULL

## 2024-10-08 ENCOUNTER — OFFICE (OUTPATIENT)
Dept: URBAN - METROPOLITAN AREA CLINIC 102 | Facility: CLINIC | Age: 84
Setting detail: OPHTHALMOLOGY
End: 2024-10-08
Payer: MEDICARE

## 2024-10-08 DIAGNOSIS — H00.11: ICD-10-CM

## 2024-10-08 DIAGNOSIS — H00.14: ICD-10-CM

## 2024-10-08 PROCEDURE — 99213 OFFICE O/P EST LOW 20 MIN: CPT | Performed by: OPHTHALMOLOGY

## 2024-10-08 ASSESSMENT — REFRACTION_MANIFEST
OS_SPHERE: +1.00
OS_VA1: 20/50-2
OS_CYLINDER: -1.25
OS_AXIS: 102
OD_AXIS: 096
OD_VA1: 20/40-2
OD_CYLINDER: -0.75
OD_SPHERE: +0.25

## 2024-10-08 ASSESSMENT — KERATOMETRY
OD_K2POWER_DIOPTERS: 42.50
OS_K2POWER_DIOPTERS: 42.50
OS_K1POWER_DIOPTERS: 41.25
OD_K1POWER_DIOPTERS: 41.75
OD_AXISANGLE_DEGREES: 173
OS_AXISANGLE_DEGREES: 033
METHOD_AUTO_MANUAL: AUTO

## 2024-10-08 ASSESSMENT — TONOMETRY
OD_IOP_MMHG: 14
OS_IOP_MMHG: 13

## 2024-10-08 ASSESSMENT — VISUAL ACUITY
OD_BCVA: 20/50-2
OS_BCVA: 20/50+2

## 2024-10-08 ASSESSMENT — PACHYMETRY
OD_CT_UM: 556
OS_CT_CORRECTION: -1
OD_CT_CORRECTION: -1
OS_CT_UM: 553

## 2024-10-08 ASSESSMENT — CONFRONTATIONAL VISUAL FIELD TEST (CVF)
OD_FINDINGS: FULL
OS_FINDINGS: FULL

## 2024-10-08 ASSESSMENT — REFRACTION_AUTOREFRACTION
OD_SPHERE: +0.25
OS_SPHERE: +1.00
OD_CYLINDER: -0.75
OD_AXIS: 096
OS_AXIS: 102
OS_CYLINDER: -1.25

## 2024-10-17 ENCOUNTER — OFFICE (OUTPATIENT)
Dept: URBAN - METROPOLITAN AREA CLINIC 102 | Facility: CLINIC | Age: 84
Setting detail: OPHTHALMOLOGY
End: 2024-10-17
Payer: MEDICARE

## 2024-10-17 DIAGNOSIS — E11.9: ICD-10-CM

## 2024-10-17 DIAGNOSIS — Z96.1: ICD-10-CM

## 2024-10-17 DIAGNOSIS — H35.3131: ICD-10-CM

## 2024-10-17 DIAGNOSIS — S00.11XA: ICD-10-CM

## 2024-10-17 DIAGNOSIS — H43.813: ICD-10-CM

## 2024-10-17 DIAGNOSIS — H40.1131: ICD-10-CM

## 2024-10-17 PROCEDURE — 99213 OFFICE O/P EST LOW 20 MIN: CPT | Performed by: OPHTHALMOLOGY

## 2024-10-17 PROCEDURE — 92133 CPTRZD OPH DX IMG PST SGM ON: CPT | Performed by: OPHTHALMOLOGY

## 2024-10-17 PROCEDURE — 92083 EXTENDED VISUAL FIELD XM: CPT | Performed by: OPHTHALMOLOGY

## 2024-10-17 ASSESSMENT — REFRACTION_AUTOREFRACTION
OS_SPHERE: +1.00
OD_AXIS: 103
OS_CYLINDER: -1.75
OD_SPHERE: 0.00
OD_CYLINDER: -0.75
OS_AXIS: 107

## 2024-10-17 ASSESSMENT — PACHYMETRY
OS_CT_UM: 553
OS_CT_CORRECTION: -1
OD_CT_CORRECTION: -1
OD_CT_UM: 556

## 2024-10-17 ASSESSMENT — KERATOMETRY
OS_K2POWER_DIOPTERS: 42.25
OD_AXISANGLE_DEGREES: 177
OD_K1POWER_DIOPTERS: 41.75
OS_K1POWER_DIOPTERS: 41.25
METHOD_AUTO_MANUAL: AUTO
OS_AXISANGLE_DEGREES: 21
OD_K2POWER_DIOPTERS: 42.25

## 2024-10-17 ASSESSMENT — REFRACTION_MANIFEST
OD_AXIS: 096
OS_CYLINDER: -1.25
OS_SPHERE: +1.00
OD_SPHERE: +0.25
OD_VA1: 20/40-2
OS_AXIS: 102
OD_CYLINDER: -0.75
OS_VA1: 20/50-2

## 2024-10-17 ASSESSMENT — VISUAL ACUITY
OS_BCVA: 20/60
OD_BCVA: 20/40-1

## 2024-10-17 ASSESSMENT — TONOMETRY
OS_IOP_MMHG: 14
OD_IOP_MMHG: 16
OD_IOP_MMHG: 11

## 2024-10-17 ASSESSMENT — CONFRONTATIONAL VISUAL FIELD TEST (CVF)
OS_FINDINGS: FULL
OD_FINDINGS: FULL

## 2024-11-08 NOTE — PHYSICAL THERAPY INITIAL EVALUATION ADULT - PHYSICAL ASSIST/NONPHYSICAL ASSIST: GAIT, REHAB EVAL
Problem: Adult Inpatient Plan of Care  Goal: Plan of Care Review  Outcome: Progressing   Chart and care plan reviewed   verbal cues/1 person assist

## 2024-11-29 ENCOUNTER — OFFICE (OUTPATIENT)
Dept: URBAN - METROPOLITAN AREA CLINIC 94 | Facility: CLINIC | Age: 84
Setting detail: OPHTHALMOLOGY
End: 2024-11-29
Payer: MEDICARE

## 2024-11-29 DIAGNOSIS — H01.004: ICD-10-CM

## 2024-11-29 DIAGNOSIS — H00.11: ICD-10-CM

## 2024-11-29 DIAGNOSIS — H01.001: ICD-10-CM

## 2024-11-29 PROCEDURE — 99213 OFFICE O/P EST LOW 20 MIN: CPT | Performed by: REGISTERED NURSE

## 2024-11-29 ASSESSMENT — KERATOMETRY
METHOD_AUTO_MANUAL: AUTO
OD_K2POWER_DIOPTERS: 43.00
OS_K2POWER_DIOPTERS: 42.25
OS_K1POWER_DIOPTERS: 41.25
OD_K1POWER_DIOPTERS: 42.50
OS_AXISANGLE_DEGREES: 016
OD_AXISANGLE_DEGREES: 048

## 2024-11-29 ASSESSMENT — TONOMETRY
OS_IOP_MMHG: 13
OD_IOP_MMHG: 14

## 2024-11-29 ASSESSMENT — REFRACTION_AUTOREFRACTION
OD_CYLINDER: -0.75
OD_AXIS: 104
OS_SPHERE: +0.75
OS_CYLINDER: -1.25
OS_AXIS: 104
OD_SPHERE: -0.50

## 2024-11-29 ASSESSMENT — LID EXAM ASSESSMENTS
OS_BLEPHARITIS: LUL 1+
OD_BLEPHARITIS: RUL 1+

## 2024-11-29 ASSESSMENT — VISUAL ACUITY
OD_BCVA: 20/40
OS_BCVA: 20/40

## 2024-11-29 ASSESSMENT — CONFRONTATIONAL VISUAL FIELD TEST (CVF)
OD_FINDINGS: FULL
OS_FINDINGS: FULL

## 2024-11-29 ASSESSMENT — PACHYMETRY
OS_CT_CORRECTION: -1
OD_CT_CORRECTION: -1
OS_CT_UM: 553
OD_CT_UM: 556

## 2024-11-29 ASSESSMENT — REFRACTION_MANIFEST
OS_SPHERE: +1.00
OD_VA1: 20/40-2
OD_CYLINDER: -0.75
OD_AXIS: 096
OD_SPHERE: +0.25
OS_CYLINDER: -1.25
OS_AXIS: 102
OS_VA1: 20/50-2

## 2024-12-20 ENCOUNTER — OFFICE (OUTPATIENT)
Dept: URBAN - METROPOLITAN AREA CLINIC 102 | Facility: CLINIC | Age: 84
Setting detail: OPHTHALMOLOGY
End: 2024-12-20
Payer: MEDICARE

## 2024-12-20 DIAGNOSIS — H01.002: ICD-10-CM

## 2024-12-20 DIAGNOSIS — H52.4: ICD-10-CM

## 2024-12-20 DIAGNOSIS — H01.001: ICD-10-CM

## 2024-12-20 DIAGNOSIS — H01.004: ICD-10-CM

## 2024-12-20 DIAGNOSIS — H01.005: ICD-10-CM

## 2024-12-20 PROCEDURE — 92015 DETERMINE REFRACTIVE STATE: CPT | Performed by: OPHTHALMOLOGY

## 2024-12-20 PROCEDURE — 99213 OFFICE O/P EST LOW 20 MIN: CPT | Performed by: OPHTHALMOLOGY

## 2024-12-20 ASSESSMENT — LID EXAM ASSESSMENTS
OD_BLEPHARITIS: RLL RUL 1+
OS_BLEPHARITIS: LLL LUL 1+

## 2024-12-20 ASSESSMENT — KERATOMETRY
OS_K2POWER_DIOPTERS: 42.25
OS_K1POWER_DIOPTERS: 41.50
METHOD_AUTO_MANUAL: AUTO
OD_K1POWER_DIOPTERS: 42.25
OD_K2POWER_DIOPTERS: 43.00
OD_AXISANGLE_DEGREES: 058
OS_AXISANGLE_DEGREES: 015

## 2024-12-20 ASSESSMENT — REFRACTION_MANIFEST
OS_CYLINDER: -1.25
OD_VA1: 20/40+
OS_SPHERE: +0.75
OS_ADD: +2.50
OD_CYLINDER: -0.75
OD_AXIS: 090
OD_ADD: +2.50
OS_AXIS: 100
OS_VA1: 20/30
OD_SPHERE: -0.50

## 2024-12-20 ASSESSMENT — REFRACTION_CURRENTRX
OD_SPHERE: -0.50
OS_CYLINDER: -0.75
OD_ADD: +2.75
OD_VPRISM_DIRECTION: BF
OS_ADD: +2.75
OS_AXIS: 116
OS_SPHERE: PLANO
OS_OVR_VA: 20/
OD_OVR_VA: 20/
OD_AXIS: 075
OS_VPRISM_DIRECTION: BF
OD_CYLINDER: -0.25

## 2024-12-20 ASSESSMENT — PACHYMETRY
OS_CT_CORRECTION: -1
OD_CT_CORRECTION: -1
OD_CT_UM: 556
OS_CT_UM: 553

## 2024-12-20 ASSESSMENT — CONFRONTATIONAL VISUAL FIELD TEST (CVF)
OS_FINDINGS: FULL
OD_FINDINGS: FULL

## 2024-12-20 ASSESSMENT — TONOMETRY
OD_IOP_MMHG: 12
OS_IOP_MMHG: 13

## 2024-12-20 ASSESSMENT — REFRACTION_AUTOREFRACTION
OD_CYLINDER: -0.75
OS_SPHERE: +1.00
OS_CYLINDER: -1.25
OD_AXIS: 091
OS_AXIS: 101
OD_SPHERE: -0.50

## 2024-12-20 ASSESSMENT — VISUAL ACUITY
OS_BCVA: 20/50
OD_BCVA: 20/30+2

## 2025-04-03 ENCOUNTER — OFFICE (OUTPATIENT)
Dept: URBAN - METROPOLITAN AREA CLINIC 102 | Facility: CLINIC | Age: 85
Setting detail: OPHTHALMOLOGY
End: 2025-04-03
Payer: MEDICARE

## 2025-04-03 DIAGNOSIS — E11.9: ICD-10-CM

## 2025-04-03 DIAGNOSIS — H01.002: ICD-10-CM

## 2025-04-03 DIAGNOSIS — H43.813: ICD-10-CM

## 2025-04-03 DIAGNOSIS — H40.1131: ICD-10-CM

## 2025-04-03 DIAGNOSIS — H35.3131: ICD-10-CM

## 2025-04-03 DIAGNOSIS — H01.004: ICD-10-CM

## 2025-04-03 DIAGNOSIS — Z96.1: ICD-10-CM

## 2025-04-03 DIAGNOSIS — H01.001: ICD-10-CM

## 2025-04-03 DIAGNOSIS — H01.005: ICD-10-CM

## 2025-04-03 PROCEDURE — 92014 COMPRE OPH EXAM EST PT 1/>: CPT | Performed by: OPHTHALMOLOGY

## 2025-04-03 PROCEDURE — 92250 FUNDUS PHOTOGRAPHY W/I&R: CPT | Performed by: OPHTHALMOLOGY

## 2025-04-03 ASSESSMENT — REFRACTION_MANIFEST
OD_SPHERE: -0.50
OS_SPHERE: +0.75
OD_CYLINDER: -0.75
OS_AXIS: 100
OS_CYLINDER: -1.25
OS_ADD: +2.50
OS_VA1: 20/30
OD_VA1: 20/40+
OD_AXIS: 090
OD_ADD: +2.50

## 2025-04-03 ASSESSMENT — VISUAL ACUITY
OD_BCVA: 20/40-1
OS_BCVA: 20/50-2

## 2025-04-03 ASSESSMENT — REFRACTION_AUTOREFRACTION
OS_AXIS: 105
OS_CYLINDER: -1.50
OD_CYLINDER: -0.75
OS_SPHERE: +1.25
OD_AXIS: 097
OD_SPHERE: +0.25

## 2025-04-03 ASSESSMENT — KERATOMETRY
OD_K1POWER_DIOPTERS: 41.75
OD_K2POWER_DIOPTERS: 42.50
OS_AXISANGLE_DEGREES: 019
METHOD_AUTO_MANUAL: AUTO
OD_AXISANGLE_DEGREES: 141
OS_K1POWER_DIOPTERS: 41.50
OS_K2POWER_DIOPTERS: 42.25

## 2025-04-03 ASSESSMENT — REFRACTION_CURRENTRX
OD_ADD: +2.75
OD_CYLINDER: -0.25
OS_SPHERE: PLANO
OD_SPHERE: -0.50
OS_VPRISM_DIRECTION: BF
OS_OVR_VA: 20/
OS_AXIS: 116
OD_AXIS: 075
OD_VPRISM_DIRECTION: BF
OS_CYLINDER: -0.75
OS_ADD: +2.75
OD_OVR_VA: 20/

## 2025-04-03 ASSESSMENT — TONOMETRY
OS_IOP_MMHG: 16
OS_IOP_MMHG: 12
OD_IOP_MMHG: 12
OD_IOP_MMHG: 14

## 2025-04-03 ASSESSMENT — LID EXAM ASSESSMENTS
OS_BLEPHARITIS: LLL LUL 1+
OD_BLEPHARITIS: RLL RUL 1+

## 2025-04-03 ASSESSMENT — PACHYMETRY
OD_CT_CORRECTION: -1
OS_CT_CORRECTION: -1
OD_CT_UM: 556
OS_CT_UM: 553

## 2025-04-03 ASSESSMENT — CONFRONTATIONAL VISUAL FIELD TEST (CVF)
OS_FINDINGS: FULL
OD_FINDINGS: FULL

## 2025-08-07 ENCOUNTER — OFFICE (OUTPATIENT)
Dept: URBAN - METROPOLITAN AREA CLINIC 102 | Facility: CLINIC | Age: 85
Setting detail: OPHTHALMOLOGY
End: 2025-08-07
Payer: MEDICARE

## 2025-08-07 ENCOUNTER — RX ONLY (RX ONLY)
Age: 85
End: 2025-08-07

## 2025-08-07 DIAGNOSIS — H01.004: ICD-10-CM

## 2025-08-07 DIAGNOSIS — H01.002: ICD-10-CM

## 2025-08-07 DIAGNOSIS — E11.9: ICD-10-CM

## 2025-08-07 DIAGNOSIS — H34.8110: ICD-10-CM

## 2025-08-07 DIAGNOSIS — H35.3131: ICD-10-CM

## 2025-08-07 DIAGNOSIS — H01.001: ICD-10-CM

## 2025-08-07 DIAGNOSIS — H40.1131: ICD-10-CM

## 2025-08-07 DIAGNOSIS — Z96.1: ICD-10-CM

## 2025-08-07 DIAGNOSIS — H01.005: ICD-10-CM

## 2025-08-07 DIAGNOSIS — H43.813: ICD-10-CM

## 2025-08-07 PROCEDURE — 99213 OFFICE O/P EST LOW 20 MIN: CPT | Performed by: OPHTHALMOLOGY

## 2025-08-07 PROCEDURE — 92134 CPTRZ OPH DX IMG PST SGM RTA: CPT | Performed by: OPHTHALMOLOGY

## 2025-08-07 ASSESSMENT — REFRACTION_MANIFEST
OS_SPHERE: +0.75
OD_AXIS: 090
OS_ADD: +2.50
OS_CYLINDER: -1.25
OS_AXIS: 100
OD_SPHERE: -0.50
OD_ADD: +2.50
OD_VA1: 20/40+
OS_VA1: 20/30
OD_CYLINDER: -0.75

## 2025-08-07 ASSESSMENT — KERATOMETRY
OD_K1POWER_DIOPTERS: 42.00
OD_AXISANGLE_DEGREES: 27
OD_K2POWER_DIOPTERS: 42.50
OS_K2POWER_DIOPTERS: 42.25
OS_AXISANGLE_DEGREES: 27
OS_K1POWER_DIOPTERS: 41.00
METHOD_AUTO_MANUAL: AUTO

## 2025-08-07 ASSESSMENT — VISUAL ACUITY
OD_BCVA: 20/25-1
OS_BCVA: 20/50

## 2025-08-07 ASSESSMENT — CONFRONTATIONAL VISUAL FIELD TEST (CVF)
OS_FINDINGS: FULL
OD_FINDINGS: FULL

## 2025-08-07 ASSESSMENT — REFRACTION_CURRENTRX
OD_ADD: +2.75
OD_VPRISM_DIRECTION: BF
OS_CYLINDER: -0.75
OS_SPHERE: PLANO
OS_ADD: +2.75
OD_SPHERE: -0.50
OS_AXIS: 116
OS_OVR_VA: 20/
OS_VPRISM_DIRECTION: BF
OD_OVR_VA: 20/
OD_AXIS: 075
OD_CYLINDER: -0.25

## 2025-08-07 ASSESSMENT — REFRACTION_AUTOREFRACTION
OS_CYLINDER: -1.50
OS_SPHERE: +1.00
OD_SPHERE: +0.50
OD_AXIS: 101
OS_AXIS: 108
OD_CYLINDER: -1.25

## 2025-08-07 ASSESSMENT — LID EXAM ASSESSMENTS
OS_BLEPHARITIS: LLL LUL 1+
OD_BLEPHARITIS: RLL RUL 1+

## 2025-08-07 ASSESSMENT — PACHYMETRY
OS_CT_UM: 553
OD_CT_UM: 556
OS_CT_CORRECTION: -1
OD_CT_CORRECTION: -1

## 2025-08-07 ASSESSMENT — TONOMETRY: OD_IOP_MMHG: 11
